# Patient Record
Sex: MALE | Race: WHITE | NOT HISPANIC OR LATINO | URBAN - METROPOLITAN AREA
[De-identification: names, ages, dates, MRNs, and addresses within clinical notes are randomized per-mention and may not be internally consistent; named-entity substitution may affect disease eponyms.]

---

## 2018-03-11 ENCOUNTER — EMERGENCY (EMERGENCY)
Facility: HOSPITAL | Age: 28
LOS: 1 days | Discharge: HOME | End: 2018-03-11
Attending: EMERGENCY MEDICINE
Payer: COMMERCIAL

## 2018-03-11 VITALS
OXYGEN SATURATION: 100 % | SYSTOLIC BLOOD PRESSURE: 127 MMHG | DIASTOLIC BLOOD PRESSURE: 73 MMHG | HEART RATE: 136 BPM | TEMPERATURE: 100 F | RESPIRATION RATE: 22 BRPM

## 2018-03-11 VITALS
DIASTOLIC BLOOD PRESSURE: 68 MMHG | SYSTOLIC BLOOD PRESSURE: 106 MMHG | HEART RATE: 109 BPM | OXYGEN SATURATION: 100 % | TEMPERATURE: 100 F | RESPIRATION RATE: 18 BRPM

## 2018-03-11 LAB
ALBUMIN SERPL ELPH-MCNC: 4.7 G/DL — SIGNIFICANT CHANGE UP (ref 3.3–5)
ALP SERPL-CCNC: 63 U/L — SIGNIFICANT CHANGE UP (ref 40–120)
ALT FLD-CCNC: 27 U/L RC — SIGNIFICANT CHANGE UP (ref 10–45)
ANION GAP SERPL CALC-SCNC: 17 MMOL/L — SIGNIFICANT CHANGE UP (ref 5–17)
APPEARANCE UR: CLEAR — SIGNIFICANT CHANGE UP
AST SERPL-CCNC: 31 U/L — SIGNIFICANT CHANGE UP (ref 10–40)
BASOPHILS # BLD AUTO: 0 K/UL — SIGNIFICANT CHANGE UP (ref 0–0.2)
BASOPHILS NFR BLD AUTO: 0.1 % — SIGNIFICANT CHANGE UP (ref 0–2)
BILIRUB SERPL-MCNC: 1 MG/DL — SIGNIFICANT CHANGE UP (ref 0.2–1.2)
BILIRUB UR-MCNC: NEGATIVE — SIGNIFICANT CHANGE UP
BUN SERPL-MCNC: 20 MG/DL — SIGNIFICANT CHANGE UP (ref 7–23)
CALCIUM SERPL-MCNC: 9.6 MG/DL — SIGNIFICANT CHANGE UP (ref 8.4–10.5)
CHLORIDE SERPL-SCNC: 96 MMOL/L — SIGNIFICANT CHANGE UP (ref 96–108)
CO2 SERPL-SCNC: 24 MMOL/L — SIGNIFICANT CHANGE UP (ref 22–31)
COLOR SPEC: YELLOW — SIGNIFICANT CHANGE UP
CREAT SERPL-MCNC: 1.12 MG/DL — SIGNIFICANT CHANGE UP (ref 0.5–1.3)
DIFF PNL FLD: NEGATIVE — SIGNIFICANT CHANGE UP
EOSINOPHIL # BLD AUTO: 0 K/UL — SIGNIFICANT CHANGE UP (ref 0–0.5)
EOSINOPHIL NFR BLD AUTO: 0.1 % — SIGNIFICANT CHANGE UP (ref 0–6)
EPI CELLS # UR: SIGNIFICANT CHANGE UP /HPF
GAS PNL BLDV: SIGNIFICANT CHANGE UP
GLUCOSE SERPL-MCNC: 131 MG/DL — HIGH (ref 70–99)
GLUCOSE UR QL: >1000 MG/DL
HCT VFR BLD CALC: 50.2 % — HIGH (ref 39–50)
HGB BLD-MCNC: 17.4 G/DL — HIGH (ref 13–17)
HMPV RNA SPEC QL NAA+PROBE: DETECTED
KETONES UR-MCNC: ABNORMAL
LEUKOCYTE ESTERASE UR-ACNC: NEGATIVE — SIGNIFICANT CHANGE UP
LYMPHOCYTES # BLD AUTO: 0.5 K/UL — LOW (ref 1–3.3)
LYMPHOCYTES # BLD AUTO: 2.8 % — LOW (ref 13–44)
MCHC RBC-ENTMCNC: 30.5 PG — SIGNIFICANT CHANGE UP (ref 27–34)
MCHC RBC-ENTMCNC: 34.7 GM/DL — SIGNIFICANT CHANGE UP (ref 32–36)
MCV RBC AUTO: 87.7 FL — SIGNIFICANT CHANGE UP (ref 80–100)
MONOCYTES # BLD AUTO: 1.2 K/UL — HIGH (ref 0–0.9)
MONOCYTES NFR BLD AUTO: 6.4 % — SIGNIFICANT CHANGE UP (ref 2–14)
NEUTROPHILS # BLD AUTO: 17 K/UL — HIGH (ref 1.8–7.4)
NEUTROPHILS NFR BLD AUTO: 90.6 % — HIGH (ref 43–77)
NITRITE UR-MCNC: NEGATIVE — SIGNIFICANT CHANGE UP
PH UR: 6.5 — SIGNIFICANT CHANGE UP (ref 5–8)
PLATELET # BLD AUTO: 215 K/UL — SIGNIFICANT CHANGE UP (ref 150–400)
POTASSIUM SERPL-MCNC: 4.6 MMOL/L — SIGNIFICANT CHANGE UP (ref 3.5–5.3)
POTASSIUM SERPL-SCNC: 4.6 MMOL/L — SIGNIFICANT CHANGE UP (ref 3.5–5.3)
PROT SERPL-MCNC: 8.4 G/DL — HIGH (ref 6–8.3)
PROT UR-MCNC: NEGATIVE — SIGNIFICANT CHANGE UP
RAPID RVP RESULT: DETECTED
RBC # BLD: 5.73 M/UL — SIGNIFICANT CHANGE UP (ref 4.2–5.8)
RBC # FLD: 11.7 % — SIGNIFICANT CHANGE UP (ref 10.3–14.5)
SODIUM SERPL-SCNC: 137 MMOL/L — SIGNIFICANT CHANGE UP (ref 135–145)
SP GR SPEC: >1.03 — HIGH (ref 1.01–1.02)
UROBILINOGEN FLD QL: NEGATIVE — SIGNIFICANT CHANGE UP
WBC # BLD: 18.8 K/UL — HIGH (ref 3.8–10.5)
WBC # FLD AUTO: 18.8 K/UL — HIGH (ref 3.8–10.5)
WBC UR QL: SIGNIFICANT CHANGE UP /HPF (ref 0–5)

## 2018-03-11 PROCEDURE — 80053 COMPREHEN METABOLIC PANEL: CPT

## 2018-03-11 PROCEDURE — 85027 COMPLETE CBC AUTOMATED: CPT

## 2018-03-11 PROCEDURE — 99284 EMERGENCY DEPT VISIT MOD MDM: CPT

## 2018-03-11 PROCEDURE — 87633 RESP VIRUS 12-25 TARGETS: CPT

## 2018-03-11 PROCEDURE — 71046 X-RAY EXAM CHEST 2 VIEWS: CPT

## 2018-03-11 PROCEDURE — 71046 X-RAY EXAM CHEST 2 VIEWS: CPT | Mod: 26

## 2018-03-11 PROCEDURE — 87486 CHLMYD PNEUM DNA AMP PROBE: CPT

## 2018-03-11 PROCEDURE — 84132 ASSAY OF SERUM POTASSIUM: CPT

## 2018-03-11 PROCEDURE — 99283 EMERGENCY DEPT VISIT LOW MDM: CPT | Mod: 25

## 2018-03-11 PROCEDURE — 82435 ASSAY OF BLOOD CHLORIDE: CPT

## 2018-03-11 PROCEDURE — 82803 BLOOD GASES ANY COMBINATION: CPT

## 2018-03-11 PROCEDURE — 85014 HEMATOCRIT: CPT

## 2018-03-11 PROCEDURE — 82947 ASSAY GLUCOSE BLOOD QUANT: CPT

## 2018-03-11 PROCEDURE — 82330 ASSAY OF CALCIUM: CPT

## 2018-03-11 PROCEDURE — 87581 M.PNEUMON DNA AMP PROBE: CPT

## 2018-03-11 PROCEDURE — 81001 URINALYSIS AUTO W/SCOPE: CPT

## 2018-03-11 PROCEDURE — 83605 ASSAY OF LACTIC ACID: CPT

## 2018-03-11 PROCEDURE — 87798 DETECT AGENT NOS DNA AMP: CPT

## 2018-03-11 PROCEDURE — 84295 ASSAY OF SERUM SODIUM: CPT

## 2018-03-11 RX ORDER — SODIUM CHLORIDE 9 MG/ML
2000 INJECTION INTRAMUSCULAR; INTRAVENOUS; SUBCUTANEOUS ONCE
Qty: 0 | Refills: 0 | Status: COMPLETED | OUTPATIENT
Start: 2018-03-11 | End: 2018-03-11

## 2018-03-11 RX ORDER — ACETAMINOPHEN 500 MG
650 TABLET ORAL ONCE
Qty: 0 | Refills: 0 | Status: COMPLETED | OUTPATIENT
Start: 2018-03-11 | End: 2018-03-11

## 2018-03-11 RX ADMIN — Medication 650 MILLIGRAM(S): at 23:20

## 2018-03-11 RX ADMIN — SODIUM CHLORIDE 2000 MILLILITER(S): 9 INJECTION INTRAMUSCULAR; INTRAVENOUS; SUBCUTANEOUS at 22:04

## 2018-03-11 NOTE — ED PROVIDER NOTE - MEDICAL DECISION MAKING DETAILS
Ann Marie Guerrero MD - Attending Physician: Pt here URI symptoms, cough, chills today. Works here so has multiple exposures. T1DM on insulin pump, tachy on arrival. Labs, IV hydration, Xr for eval

## 2018-03-11 NOTE — ED PROVIDER NOTE - PROGRESS NOTE DETAILS
+HMV. Xr neg. Mild dehydration on labs, now s/p rehydration. Feels better. Comfortable going home. Will dc. Discussed continued hydration at home, f/u with pcp, and return precautions

## 2018-03-11 NOTE — ED PROVIDER NOTE - NS_ ATTENDINGSCRIBEDETAILS _ED_A_ED_FT
Ann Marie Guerrero MD - Attending Physician: The scribe's documentation has been prepared under my direction and personally reviewed by me in its entirety. I confirm that the note above accurately reflects all work, treatment, procedures, and medical decision making performed by me.

## 2018-03-11 NOTE — ED ADULT NURSE NOTE - OBJECTIVE STATEMENT
27y male presents to ED complaining of flu like symptoms. Pt is a/ox3 and states that since Thursday he has had a cough and body aches. Pt states that today he developed chills and fever. Pt states its a productive cough with green sputum. Pt breathing spontaneous and unlabored with rhonchi to auscultation bilaterally. Pt skin is warm, dry and intact with no edema present. Pt abdomen soft, nontender, nondistended with bowel sounds present in all 4 quadrants. Pt PERRL, with equal strength bilaterally in upper and lower extremities with full sensation. Pt denies chest pain and SOB, denies n/v/d, denies dysuria, denies numbness/tingling and weakness. Pt denies throat pain and difficulty swallowing.

## 2018-03-11 NOTE — ED PROVIDER NOTE - CARE PLAN
Principal Discharge DX:	Upper respiratory tract infection, unspecified type  Secondary Diagnosis:	Human metapneumovirus (hMPV) as cause of disease classified elsewhere

## 2018-03-11 NOTE — ED PROVIDER NOTE - OBJECTIVE STATEMENT
26 yo m with pmhx of dm type 1 presents with cough and sore throat since 3 days ago on Thursday. pt reports it worsened today along with chills, aches, and nausea. Denies trouble breathing.

## 2018-04-18 ENCOUNTER — RESULT REVIEW (OUTPATIENT)
Age: 28
End: 2018-04-18

## 2018-05-09 ENCOUNTER — RESULT REVIEW (OUTPATIENT)
Age: 28
End: 2018-05-09

## 2019-10-13 ENCOUNTER — TRANSCRIPTION ENCOUNTER (OUTPATIENT)
Age: 29
End: 2019-10-13

## 2019-11-13 PROBLEM — E11.9 TYPE 2 DIABETES MELLITUS WITHOUT COMPLICATIONS: Chronic | Status: ACTIVE | Noted: 2018-03-11

## 2019-11-22 ENCOUNTER — OUTPATIENT (OUTPATIENT)
Dept: OUTPATIENT SERVICES | Facility: HOSPITAL | Age: 29
LOS: 1 days | End: 2019-11-22
Payer: COMMERCIAL

## 2019-11-22 ENCOUNTER — APPOINTMENT (OUTPATIENT)
Dept: MRI IMAGING | Facility: CLINIC | Age: 29
End: 2019-11-22
Payer: COMMERCIAL

## 2019-11-22 DIAGNOSIS — Z00.8 ENCOUNTER FOR OTHER GENERAL EXAMINATION: ICD-10-CM

## 2019-11-22 PROCEDURE — 72148 MRI LUMBAR SPINE W/O DYE: CPT | Mod: 26

## 2019-11-22 PROCEDURE — 72148 MRI LUMBAR SPINE W/O DYE: CPT

## 2020-03-09 ENCOUNTER — APPOINTMENT (OUTPATIENT)
Dept: ENDOCRINOLOGY | Facility: CLINIC | Age: 30
End: 2020-03-09
Payer: COMMERCIAL

## 2020-03-09 VITALS
HEART RATE: 98 BPM | OXYGEN SATURATION: 98 % | WEIGHT: 244 LBS | SYSTOLIC BLOOD PRESSURE: 119 MMHG | DIASTOLIC BLOOD PRESSURE: 78 MMHG

## 2020-03-09 DIAGNOSIS — Z83.3 FAMILY HISTORY OF DIABETES MELLITUS: ICD-10-CM

## 2020-03-09 PROCEDURE — 99204 OFFICE O/P NEW MOD 45 MIN: CPT | Mod: 25

## 2020-03-09 PROCEDURE — 95251 CONT GLUC MNTR ANALYSIS I&R: CPT

## 2020-03-09 RX ORDER — GLUCAGON 3 MG/1
3 POWDER NASAL
Qty: 1 | Refills: 3 | Status: ACTIVE | COMMUNITY
Start: 2020-03-09 | End: 1900-01-01

## 2020-04-08 ENCOUNTER — TRANSCRIPTION ENCOUNTER (OUTPATIENT)
Age: 30
End: 2020-04-08

## 2020-04-13 ENCOUNTER — TRANSCRIPTION ENCOUNTER (OUTPATIENT)
Age: 30
End: 2020-04-13

## 2020-04-25 ENCOUNTER — MESSAGE (OUTPATIENT)
Age: 30
End: 2020-04-25

## 2020-05-02 LAB
SARS-COV-2 IGG SERPL IA-ACNC: <0.1 INDEX
SARS-COV-2 IGG SERPL QL IA: NEGATIVE

## 2020-05-04 ENCOUNTER — APPOINTMENT (OUTPATIENT)
Dept: DISASTER EMERGENCY | Facility: HOSPITAL | Age: 30
End: 2020-05-04

## 2020-06-14 ENCOUNTER — TRANSCRIPTION ENCOUNTER (OUTPATIENT)
Age: 30
End: 2020-06-14

## 2020-06-15 ENCOUNTER — APPOINTMENT (OUTPATIENT)
Dept: ENDOCRINOLOGY | Facility: CLINIC | Age: 30
End: 2020-06-15
Payer: COMMERCIAL

## 2020-06-15 VITALS
HEIGHT: 69 IN | SYSTOLIC BLOOD PRESSURE: 120 MMHG | TEMPERATURE: 97.9 F | DIASTOLIC BLOOD PRESSURE: 82 MMHG | BODY MASS INDEX: 37.03 KG/M2 | OXYGEN SATURATION: 98 % | WEIGHT: 250 LBS

## 2020-06-15 LAB — GLUCOSE BLDC GLUCOMTR-MCNC: 100

## 2020-06-15 PROCEDURE — 82962 GLUCOSE BLOOD TEST: CPT

## 2020-06-15 PROCEDURE — 95251 CONT GLUC MNTR ANALYSIS I&R: CPT

## 2020-06-15 PROCEDURE — 99214 OFFICE O/P EST MOD 30 MIN: CPT | Mod: 25

## 2020-09-01 ENCOUNTER — APPOINTMENT (OUTPATIENT)
Dept: ENDOCRINOLOGY | Facility: CLINIC | Age: 30
End: 2020-09-01

## 2020-09-16 ENCOUNTER — APPOINTMENT (OUTPATIENT)
Dept: ENDOCRINOLOGY | Facility: CLINIC | Age: 30
End: 2020-09-16
Payer: COMMERCIAL

## 2020-09-16 VITALS
WEIGHT: 249 LBS | TEMPERATURE: 97.9 F | DIASTOLIC BLOOD PRESSURE: 80 MMHG | OXYGEN SATURATION: 98 % | SYSTOLIC BLOOD PRESSURE: 120 MMHG | BODY MASS INDEX: 36.88 KG/M2 | HEART RATE: 82 BPM | HEIGHT: 69 IN

## 2020-09-16 LAB — GLUCOSE BLDC GLUCOMTR-MCNC: 137

## 2020-09-16 PROCEDURE — 99214 OFFICE O/P EST MOD 30 MIN: CPT

## 2020-09-16 NOTE — PHYSICAL EXAM
[Well Nourished] : well nourished [Alert] : alert [No Acute Distress] : no acute distress [No Proptosis] : no proptosis [Normal Sclera/Conjunctiva] : normal sclera/conjunctiva [PERRL] : pupils equal, round and reactive to light [Normal Hearing] : hearing was normal [Normal Outer Ear/Nose] : the ears and nose were normal in appearance [No Respiratory Distress] : no respiratory distress [No Neck Mass] : no neck mass was observed [Normal TMs] : both tympanic membranes were normal [Thyroid Not Enlarged] : the thyroid was not enlarged [Normal S1, S2] : normal S1 and S2 [Clear to Auscultation] : lungs were clear to auscultation bilaterally [Normal Rate] : heart rate was normal [Not Tender] : non-tender [Regular Rhythm] : with a regular rhythm [Normal Bowel Sounds] : normal bowel sounds [No Clubbing, Cyanosis] : no clubbing  or cyanosis of the fingernails [Normal Gait] : normal gait [Soft] : abdomen soft [No Rash] : no rash [Normal Strength/Tone] : muscle strength and tone were normal [No Joint Swelling] : no joint swelling seen [No Tremors] : no tremors [No Skin Lesions] : no skin lesions [Normal Reflexes] : deep tendon reflexes were 2+ and symmetric [No Motor Deficits] : the motor exam was normal [Oriented x3] : oriented to person, place, and time [Normal Affect] : the affect was normal [Normal Insight/Judgement] : insight and judgment were intact [Normal Mood] : the mood was normal

## 2020-09-16 NOTE — REVIEW OF SYSTEMS
[Recent Weight Gain (___ Lbs)] : no recent weight gain [Decreased Appetite] : appetite not decreased [Fatigue] : no fatigue [Recent Weight Loss (___ Lbs)] : no recent weight loss [Visual Field Defect] : no visual field defect [Dry Eyes] : no dryness [Dysphagia] : no dysphagia [Nasal Congestion] : no nasal congestion [Neck Pain] : no neck pain [Dysphonia] : no dysphonia [Slow Heart Rate] : heart rate is not slow [Palpitations] : no palpitations [Chest Pain] : no chest pain [Fast Heart Rate] : heart rate is not fast [Cough] : no cough [Shortness Of Breath] : no shortness of breath [Vomiting] : no vomiting [Diarrhea] : no diarrhea [Nausea] : no nausea [Constipation] : no constipation [Joint Pain] : no joint pain [Hesistancy] : no hesitancy [Polyuria] : no polyuria [Headaches] : no headaches [Acanthosis] : no acanthosis  [Muscle Weakness] : no muscle weakness [Tremors] : no tremors [Dizziness] : no dizziness [Depression] : no depression [Polydipsia] : no polydipsia [Cold Intolerance] : no cold intolerance [Easy Bruising] : no tendency for easy bruising [Easy Bleeding] : no ~M tendency for easy bleeding

## 2020-09-16 NOTE — HISTORY OF PRESENT ILLNESS
[FreeTextEntry1] : 29 y/o male here for f/u management of T1DM (A1c 6.5% in 09 2020)\par \par \par 6 am: 120-150\par 12 pm: 120s ( first meal) \par 7 Pm: \par \par Hypoglycemia: Between and lunch and dinner \par \par DM was diagnosed at age 14. No h/o DKA in the past. \par Complications include: Last ophtho Nov 2019, + h/o microalbuminuria and resolved. No neuropathy. No MI or CVA\par Current DM Medications/Insulin: \par Medtronic 670 G (started in 2019, 1 year) and Dexcom G6. Cannot use auto-mode since using a Dexcom CGM. Did not like Medtronic Guardian sensor in the past. \par \par Past DM Medications/Insulin: Invokana 300 mg QD from April 2015 to Aug 2018. Lost some weight but he did not want to cont. He was on Metformin in the past and had GI problems. Not tried Trulicity in the past, was on Symlin in the past. \par \par Current Fingerstick Glucose Logs: Dexcom Downloaded\par 7.0% with hyperglycemia from 9pm to 4am \par Normal glucose values during the day\par \par Dexcom Alarms:\par Low On 80, urgent Low On 55\par Low repeat On 30 min\par High On 180\par High repeat On 120 min\par Hypos can be 2x/week and he is aware of symptoms with FS 70\par \par Changes his own insulin pump settings, last changed 5-6 months ago. Suspends 15 mins during showers. Runs 1-2x/week and will make sure glucose is normal and will have it suspended for 1-1.5 hrs. Sometimes normal FS or mildly high after running. Does not use temp basal feature. Changes ICR from 1:7 to 1:6 if he is aware will have high carb meal. Will not change it daily. 95% of the time ICR is 1:7. Admits to leaking infusion site if he has to take huge bolus and then has to constantly correct himself. In the last month, had to do it 3 times -- at night or evening. \par Changes site usually every 2.5-3 days and changes Dexcom every 10 days\par States alarms are going off at night since FS high after dinner and before snack and then throughout the night.\par \par Had used pens in the past, has Lantus and Novolog, has it at home. Does not have Glucagon. \par \par Current Diet Includes: mostly eating 2 meals (usually lunch and dinner). States always confident in carb counting\par Breakfast: mostly skips, omelette, english muffin and cereal -- 60-80 grams of carbs\par Lunch: cold cut sandwich, bagel, 100 calorie potato chip, granola bar, occ donut and iced coffee -- 60-80 grams\par Dinner 7pm - biggest meal: hamburgers, hot dogs, BBQ, personal pizza pies, rainbow cookie or cake (always have a desert) -- states  grams\par Snacks before bed 9pm - cold cuts, peanut butter pretzels, rice cakes with almond butter --- carb counting and using waizard\par Drinks: no soda. Drinks juice if low.\par Works 3-11pm or 4pm-12am at [employer] [facility] [employer]\par Sleeps late, awake by 10am\par \par \par (He has cut out on carbs) \par Additional history:\par PMH: Obesity, Vitamin D Deficiency, Mild HLD\par Meds: Vitamin D3 1000 units QD\par FH: Dad and GM with T2DM. Maternal GF and uncle with T2DM \par

## 2020-09-16 NOTE — ASSESSMENT
[FreeTextEntry1] : 31 y/o male with well controlled T1DM (A1c 6.5%) without complications. Also with mild HLD () and vitamin D deficiency. Will be reducing down the basal rate between 5-8 PM since patient has hypoglycemia during that time \par \par T1DM\par - Goal A1c 6.5% without hypos, patient near goal at this time\par - I had a lengthy discussion regarding healthy diet (consistent carbohydrates and low calorie content) \par - 12am-1.55, 1am-1.50, 5pm-1.35, 8pm-1.55 (total basal 36.1)\par - ICR 12am-6.7, 7am-7.0, 6pm-6.3\par - Continue using Dexcom G6 and Medtronic 670G, unable to use auto mode since not using guardian sensor\par - Has Baqsimi at home, lives with his girlfriend\par -Will check BMP, lipid panel and microal/cr ratio today \par \par Vitamin D deficiency\par - Continue Vitamin D3 1000 units QD\par - Levels normal in May 2020\par \par Mild HLD\par -  and his ASCVD risk is low\par - We discussed that ADA guidelines suggest statin for all DM patient regardless of LDL given increased risk of MI\par -Will check lipid panel today \par \par F/u with Dr. Pimentel in 3 months \par

## 2020-10-05 ENCOUNTER — TRANSCRIPTION ENCOUNTER (OUTPATIENT)
Age: 30
End: 2020-10-05

## 2020-10-05 LAB
ALBUMIN SERPL ELPH-MCNC: 4.7 G/DL
ALP BLD-CCNC: 63 U/L
ALT SERPL-CCNC: 27 U/L
ANION GAP SERPL CALC-SCNC: 14 MMOL/L
AST SERPL-CCNC: 27 U/L
BILIRUB SERPL-MCNC: 0.7 MG/DL
BUN SERPL-MCNC: 13 MG/DL
CALCIUM SERPL-MCNC: 10 MG/DL
CHLORIDE SERPL-SCNC: 99 MMOL/L
CHOLEST SERPL-MCNC: 164 MG/DL
CHOLEST/HDLC SERPL: 2.7 RATIO
CO2 SERPL-SCNC: 26 MMOL/L
CREAT SERPL-MCNC: 1 MG/DL
CREAT SPEC-SCNC: 364 MG/DL
GLUCOSE SERPL-MCNC: 135 MG/DL
HDLC SERPL-MCNC: 61 MG/DL
LDLC SERPL CALC-MCNC: 91 MG/DL
MICROALBUMIN 24H UR DL<=1MG/L-MCNC: 1.6 MG/DL
MICROALBUMIN/CREAT 24H UR-RTO: 4 MG/G
POTASSIUM SERPL-SCNC: 4.5 MMOL/L
PROT SERPL-MCNC: 7.1 G/DL
SODIUM SERPL-SCNC: 139 MMOL/L
T4 FREE SERPL-MCNC: 1.4 NG/DL
TRIGL SERPL-MCNC: 60 MG/DL
TSH SERPL-ACNC: 1.97 UIU/ML

## 2020-10-08 ENCOUNTER — TRANSCRIPTION ENCOUNTER (OUTPATIENT)
Age: 30
End: 2020-10-08

## 2020-12-14 ENCOUNTER — APPOINTMENT (OUTPATIENT)
Dept: ENDOCRINOLOGY | Facility: CLINIC | Age: 30
End: 2020-12-14

## 2020-12-14 ENCOUNTER — TRANSCRIPTION ENCOUNTER (OUTPATIENT)
Age: 30
End: 2020-12-14

## 2020-12-15 ENCOUNTER — TRANSCRIPTION ENCOUNTER (OUTPATIENT)
Age: 30
End: 2020-12-15

## 2020-12-30 ENCOUNTER — APPOINTMENT (OUTPATIENT)
Dept: ENDOCRINOLOGY | Facility: CLINIC | Age: 30
End: 2020-12-30
Payer: COMMERCIAL

## 2020-12-30 VITALS
SYSTOLIC BLOOD PRESSURE: 128 MMHG | WEIGHT: 247 LBS | OXYGEN SATURATION: 98 % | HEIGHT: 69 IN | BODY MASS INDEX: 36.58 KG/M2 | DIASTOLIC BLOOD PRESSURE: 70 MMHG | TEMPERATURE: 98.1 F | HEART RATE: 89 BPM

## 2020-12-30 LAB
GLUCOSE BLDC GLUCOMTR-MCNC: 135
HBA1C MFR BLD HPLC: 6.1

## 2020-12-30 PROCEDURE — 82962 GLUCOSE BLOOD TEST: CPT

## 2020-12-30 PROCEDURE — 99214 OFFICE O/P EST MOD 30 MIN: CPT | Mod: 25

## 2020-12-30 PROCEDURE — 99072 ADDL SUPL MATRL&STAF TM PHE: CPT

## 2020-12-30 PROCEDURE — 83036 HEMOGLOBIN GLYCOSYLATED A1C: CPT | Mod: QW

## 2020-12-30 NOTE — REVIEW OF SYSTEMS
[Fatigue] : no fatigue [Decreased Appetite] : appetite not decreased [Recent Weight Gain (___ Lbs)] : no recent weight gain [Recent Weight Loss (___ Lbs)] : no recent weight loss [Visual Field Defect] : no visual field defect [Dry Eyes] : no dryness [Dysphagia] : no dysphagia [Neck Pain] : no neck pain [Dysphonia] : no dysphonia [Nasal Congestion] : no nasal congestion [Chest Pain] : no chest pain [Slow Heart Rate] : heart rate is not slow [Palpitations] : no palpitations [Fast Heart Rate] : heart rate is not fast [Shortness Of Breath] : no shortness of breath [Cough] : no cough [Nausea] : no nausea [Constipation] : no constipation [Vomiting] : no vomiting [Diarrhea] : no diarrhea [Polyuria] : no polyuria [Hesistancy] : no hesitancy [Joint Pain] : no joint pain [Headaches] : no headaches [Dizziness] : no dizziness [Tremors] : no tremors [Pain/Numbness of Digits] : no pain/numbness of digits [Depression] : no depression [Polydipsia] : no polydipsia [Cold Intolerance] : no cold intolerance [Easy Bleeding] : no ~M tendency for easy bleeding [Easy Bruising] : no tendency for easy bruising

## 2020-12-30 NOTE — ASSESSMENT
[FreeTextEntry1] : 31 y/o male with well controlled T1DM (A1c 6.1%) without complications. Also with mild HLD () and vitamin D deficiency. Noted to have hyperglycemia post dinner due to snacking. Possibly insulin resistance post meal. \par \par T1DM\par - Goal A1c 6.1% without hypos, patient near goal at this time\par - I had a lengthy discussion regarding healthy diet (consistent carbohydrates and low calorie content) \par - 12am-1.55, 1am-1.50, 5pm-1.35, 8pm-1.55 (total basal 36.1)\par - ICR 12am-6.7, 7am-7.0, 6pm-6.3\par -Will be changing ICR to 6.0 between 9 pm- 1 am \par - Continue using Dexcom G6 and Medtronic 670G, unable to use auto mode since not using guardian sensor\par - Has Baqsimi at home, lives with his girlfriend\par \par \par Vitamin D deficiency\par - Continue Vitamin D3 1000 units QD\par - Levels normal in May 2020\par \par Mild HLD\par - LDL 91 and his ASCVD risk is low\par - We discussed that ADA guidelines suggest statin for all DM patient regardless of LDL given increased risk of MI\par \par \par F/u with Dr. Pimentel in 5 months \par \par

## 2020-12-30 NOTE — HISTORY OF PRESENT ILLNESS
[FreeTextEntry1] : 31 y/o male here for f/u management of T1DM (A1c 6.5% in 09 2020)\par \par Reuiring 2-3 corrections in middle of the night \par \par 200s overnight \par 6 am: 140-150\par 12 pm: 100-130 ( first meal) \par 7 Pm: 100-130\par \par 11PM: 150-180\par \par Hypoglycemia: Between and lunch and dinner \par \par DM was diagnosed at age 14. No h/o DKA in the past. \par Complications include: Last ophtho Nov 2019, + h/o microalbuminuria and resolved. No neuropathy. No MI or CVA\par Current DM Medications/Insulin: \par Medtronic 670 G (started in 2019, 1 year) and Dexcom G6. Cannot use auto-mode since using a Dexcom CGM. Did not like Medtronic Guardian sensor in the past. \par \par Past DM Medications/Insulin: Invokana 300 mg QD from April 2015 to Aug 2018. Lost some weight but he did not want to cont. He was on Metformin in the past and had GI problems. Not tried Trulicity in the past, was on Symlin in the past. \par \par Rarely having hypoglycemia \par \par \par No retinopathy 10/2020\par No neuropathy \par Working on weight loss \par \par -He has been working out without the pump \par \par \par Changes his own insulin pump settings, last changed 5-6 months ago. Suspends 15 mins during showers. Runs 1-2x/week and will make sure glucose is normal and will have it suspended for 1-1.5 hrs. Sometimes normal FS or mildly high after running. Does not use temp basal feature. Changes ICR from 1:7 to 1:6 if he is aware will have high carb meal. Will not change it daily. 95% of the time ICR is 1:7. Admits to leaking infusion site if he has to take huge bolus and then has to constantly correct himself. In the last month, had to do it 3 times -- at night or evening. \par Changes site usually every 2.5-3 days and changes Dexcom every 10 days\par States alarms are going off at night since FS high after dinner and before snack and then throughout the night.\par \par Had used pens in the past, has Lantus and Novolog, has it at home. Does not have Glucagon. \par \par Current Diet Includes: mostly eating 2 meals (usually lunch and dinner). States always confident in carb counting\par Breakfast: mostly skips, omelette, english muffin and cereal -- 60-80 grams of carbs\par Lunch: cold cut sandwich, bagel, 100 calorie potato chip, granola bar, occ donut and iced coffee -- 60-80 grams\par Dinner 7pm - biggest meal: hamburgers, hot dogs, BBQ, personal pizza pies, rainbow cookie or cake (always have a desert) -- states  grams\par Snacks before bed 9pm - cold cuts, peanut butter pretzels, rice cakes with almond butter --- carb counting and using waizard\par Drinks: no soda. Drinks juice if low.\par Works 3-11pm or 4pm-12am at [employer]\par Sleeps late, awake by 10am\par \par \par (He has cut out on carbs) \par Additional history:\par PMH: Obesity, Vitamin D Deficiency, Mild HLD\par Meds: Vitamin D3 1000 units QD\par FH: Dad and GM with T2DM. Maternal GF and uncle with T2DM \par

## 2021-01-04 ENCOUNTER — APPOINTMENT (OUTPATIENT)
Dept: ENDOCRINOLOGY | Facility: CLINIC | Age: 31
End: 2021-01-04

## 2021-01-27 ENCOUNTER — APPOINTMENT (OUTPATIENT)
Dept: PSYCHIATRY | Facility: CLINIC | Age: 31
End: 2021-01-27

## 2021-02-01 ENCOUNTER — APPOINTMENT (OUTPATIENT)
Dept: PSYCHIATRY | Facility: CLINIC | Age: 31
End: 2021-02-01

## 2021-02-10 ENCOUNTER — APPOINTMENT (OUTPATIENT)
Dept: PSYCHIATRY | Facility: CLINIC | Age: 31
End: 2021-02-10
Payer: COMMERCIAL

## 2021-02-10 PROCEDURE — 99072 ADDL SUPL MATRL&STAF TM PHE: CPT

## 2021-02-10 PROCEDURE — 90791 PSYCH DIAGNOSTIC EVALUATION: CPT

## 2021-02-17 ENCOUNTER — APPOINTMENT (OUTPATIENT)
Dept: PSYCHIATRY | Facility: CLINIC | Age: 31
End: 2021-02-17
Payer: COMMERCIAL

## 2021-02-17 PROCEDURE — 99072 ADDL SUPL MATRL&STAF TM PHE: CPT

## 2021-02-17 PROCEDURE — 90834 PSYTX W PT 45 MINUTES: CPT

## 2021-02-22 ENCOUNTER — APPOINTMENT (OUTPATIENT)
Dept: PSYCHIATRY | Facility: CLINIC | Age: 31
End: 2021-02-22
Payer: COMMERCIAL

## 2021-02-22 PROCEDURE — 99072 ADDL SUPL MATRL&STAF TM PHE: CPT

## 2021-02-22 PROCEDURE — 90837 PSYTX W PT 60 MINUTES: CPT

## 2021-03-01 ENCOUNTER — APPOINTMENT (OUTPATIENT)
Dept: PSYCHIATRY | Facility: CLINIC | Age: 31
End: 2021-03-01
Payer: COMMERCIAL

## 2021-03-01 PROCEDURE — 90837 PSYTX W PT 60 MINUTES: CPT

## 2021-03-01 PROCEDURE — 99072 ADDL SUPL MATRL&STAF TM PHE: CPT

## 2021-03-08 ENCOUNTER — APPOINTMENT (OUTPATIENT)
Dept: PSYCHIATRY | Facility: CLINIC | Age: 31
End: 2021-03-08
Payer: COMMERCIAL

## 2021-03-08 PROCEDURE — 99072 ADDL SUPL MATRL&STAF TM PHE: CPT

## 2021-03-08 PROCEDURE — 90837 PSYTX W PT 60 MINUTES: CPT

## 2021-03-15 ENCOUNTER — APPOINTMENT (OUTPATIENT)
Dept: PSYCHIATRY | Facility: CLINIC | Age: 31
End: 2021-03-15
Payer: COMMERCIAL

## 2021-03-15 PROCEDURE — 90837 PSYTX W PT 60 MINUTES: CPT

## 2021-03-15 PROCEDURE — 99072 ADDL SUPL MATRL&STAF TM PHE: CPT

## 2021-03-22 ENCOUNTER — APPOINTMENT (OUTPATIENT)
Dept: PSYCHIATRY | Facility: CLINIC | Age: 31
End: 2021-03-22
Payer: COMMERCIAL

## 2021-03-22 PROCEDURE — 90837 PSYTX W PT 60 MINUTES: CPT

## 2021-03-22 PROCEDURE — 99072 ADDL SUPL MATRL&STAF TM PHE: CPT

## 2021-03-29 ENCOUNTER — APPOINTMENT (OUTPATIENT)
Dept: PSYCHIATRY | Facility: CLINIC | Age: 31
End: 2021-03-29
Payer: COMMERCIAL

## 2021-03-29 PROCEDURE — 90837 PSYTX W PT 60 MINUTES: CPT

## 2021-03-29 PROCEDURE — 99072 ADDL SUPL MATRL&STAF TM PHE: CPT

## 2021-04-05 ENCOUNTER — APPOINTMENT (OUTPATIENT)
Dept: PSYCHIATRY | Facility: CLINIC | Age: 31
End: 2021-04-05
Payer: COMMERCIAL

## 2021-04-05 PROCEDURE — 90837 PSYTX W PT 60 MINUTES: CPT

## 2021-04-05 PROCEDURE — 99072 ADDL SUPL MATRL&STAF TM PHE: CPT

## 2021-04-12 ENCOUNTER — NON-APPOINTMENT (OUTPATIENT)
Age: 31
End: 2021-04-12

## 2021-04-12 ENCOUNTER — APPOINTMENT (OUTPATIENT)
Dept: PSYCHIATRY | Facility: CLINIC | Age: 31
End: 2021-04-12
Payer: COMMERCIAL

## 2021-04-12 PROCEDURE — 99072 ADDL SUPL MATRL&STAF TM PHE: CPT

## 2021-04-12 PROCEDURE — 90837 PSYTX W PT 60 MINUTES: CPT

## 2021-04-19 ENCOUNTER — APPOINTMENT (OUTPATIENT)
Dept: PSYCHIATRY | Facility: CLINIC | Age: 31
End: 2021-04-19
Payer: COMMERCIAL

## 2021-04-19 PROCEDURE — 90837 PSYTX W PT 60 MINUTES: CPT

## 2021-04-19 PROCEDURE — 99072 ADDL SUPL MATRL&STAF TM PHE: CPT

## 2021-05-03 ENCOUNTER — APPOINTMENT (OUTPATIENT)
Dept: PSYCHIATRY | Facility: CLINIC | Age: 31
End: 2021-05-03
Payer: COMMERCIAL

## 2021-05-03 PROCEDURE — 90837 PSYTX W PT 60 MINUTES: CPT

## 2021-05-03 PROCEDURE — 99072 ADDL SUPL MATRL&STAF TM PHE: CPT

## 2021-05-10 ENCOUNTER — APPOINTMENT (OUTPATIENT)
Dept: PSYCHIATRY | Facility: CLINIC | Age: 31
End: 2021-05-10
Payer: COMMERCIAL

## 2021-05-10 PROCEDURE — 90837 PSYTX W PT 60 MINUTES: CPT

## 2021-05-10 PROCEDURE — 99072 ADDL SUPL MATRL&STAF TM PHE: CPT

## 2021-05-17 ENCOUNTER — APPOINTMENT (OUTPATIENT)
Dept: PSYCHIATRY | Facility: CLINIC | Age: 31
End: 2021-05-17
Payer: COMMERCIAL

## 2021-05-17 PROCEDURE — 99072 ADDL SUPL MATRL&STAF TM PHE: CPT

## 2021-05-17 PROCEDURE — 90837 PSYTX W PT 60 MINUTES: CPT

## 2021-05-24 ENCOUNTER — APPOINTMENT (OUTPATIENT)
Dept: PSYCHIATRY | Facility: CLINIC | Age: 31
End: 2021-05-24

## 2021-06-07 ENCOUNTER — APPOINTMENT (OUTPATIENT)
Dept: PSYCHIATRY | Facility: CLINIC | Age: 31
End: 2021-06-07
Payer: COMMERCIAL

## 2021-06-07 PROCEDURE — 99072 ADDL SUPL MATRL&STAF TM PHE: CPT

## 2021-06-07 PROCEDURE — 90837 PSYTX W PT 60 MINUTES: CPT

## 2021-06-09 ENCOUNTER — APPOINTMENT (OUTPATIENT)
Dept: ENDOCRINOLOGY | Facility: CLINIC | Age: 31
End: 2021-06-09
Payer: COMMERCIAL

## 2021-06-09 VITALS
OXYGEN SATURATION: 97 % | DIASTOLIC BLOOD PRESSURE: 78 MMHG | BODY MASS INDEX: 34.8 KG/M2 | HEIGHT: 69 IN | SYSTOLIC BLOOD PRESSURE: 118 MMHG | HEART RATE: 92 BPM | WEIGHT: 235 LBS

## 2021-06-09 LAB
GLUCOSE BLDC GLUCOMTR-MCNC: 212
HBA1C MFR BLD HPLC: 6.5

## 2021-06-09 PROCEDURE — 83036 HEMOGLOBIN GLYCOSYLATED A1C: CPT | Mod: QW

## 2021-06-09 PROCEDURE — 99072 ADDL SUPL MATRL&STAF TM PHE: CPT

## 2021-06-09 PROCEDURE — 95251 CONT GLUC MNTR ANALYSIS I&R: CPT

## 2021-06-09 PROCEDURE — 82962 GLUCOSE BLOOD TEST: CPT

## 2021-06-09 PROCEDURE — 36415 COLL VENOUS BLD VENIPUNCTURE: CPT

## 2021-06-09 PROCEDURE — 99214 OFFICE O/P EST MOD 30 MIN: CPT | Mod: 25

## 2021-06-14 ENCOUNTER — APPOINTMENT (OUTPATIENT)
Dept: PSYCHIATRY | Facility: CLINIC | Age: 31
End: 2021-06-14
Payer: COMMERCIAL

## 2021-06-14 PROCEDURE — 99072 ADDL SUPL MATRL&STAF TM PHE: CPT

## 2021-06-14 PROCEDURE — 90837 PSYTX W PT 60 MINUTES: CPT

## 2021-06-16 ENCOUNTER — TRANSCRIPTION ENCOUNTER (OUTPATIENT)
Age: 31
End: 2021-06-16

## 2021-06-16 LAB
25(OH)D3 SERPL-MCNC: 34.7 NG/ML
ALBUMIN SERPL ELPH-MCNC: 4.7 G/DL
ALP BLD-CCNC: 73 U/L
ALT SERPL-CCNC: 28 U/L
ANION GAP SERPL CALC-SCNC: 9 MMOL/L
AST SERPL-CCNC: 27 U/L
BASOPHILS # BLD AUTO: 0.03 K/UL
BASOPHILS NFR BLD AUTO: 0.5 %
BILIRUB SERPL-MCNC: 0.3 MG/DL
BUN SERPL-MCNC: 14 MG/DL
CALCIUM SERPL-MCNC: 9.3 MG/DL
CHLORIDE SERPL-SCNC: 102 MMOL/L
CHOLEST SERPL-MCNC: 190 MG/DL
CO2 SERPL-SCNC: 28 MMOL/L
CREAT SERPL-MCNC: 0.88 MG/DL
CREAT SPEC-SCNC: 257 MG/DL
EOSINOPHIL # BLD AUTO: 0.08 K/UL
EOSINOPHIL NFR BLD AUTO: 1.3 %
GLUCOSE SERPL-MCNC: 202 MG/DL
HCT VFR BLD CALC: 47.2 %
HDLC SERPL-MCNC: 58 MG/DL
HGB BLD-MCNC: 15.8 G/DL
IMM GRANULOCYTES NFR BLD AUTO: 0.2 %
LDLC SERPL CALC-MCNC: 121 MG/DL
LYMPHOCYTES # BLD AUTO: 1.32 K/UL
LYMPHOCYTES NFR BLD AUTO: 21.3 %
MAN DIFF?: NORMAL
MCHC RBC-ENTMCNC: 28.3 PG
MCHC RBC-ENTMCNC: 33.5 GM/DL
MCV RBC AUTO: 84.4 FL
MICROALBUMIN 24H UR DL<=1MG/L-MCNC: 1.6 MG/DL
MICROALBUMIN/CREAT 24H UR-RTO: 6 MG/G
MONOCYTES # BLD AUTO: 0.5 K/UL
MONOCYTES NFR BLD AUTO: 8.1 %
NEUTROPHILS # BLD AUTO: 4.27 K/UL
NEUTROPHILS NFR BLD AUTO: 68.6 %
NONHDLC SERPL-MCNC: 132 MG/DL
PLATELET # BLD AUTO: 271 K/UL
POTASSIUM SERPL-SCNC: 4.7 MMOL/L
PROT SERPL-MCNC: 7 G/DL
RBC # BLD: 5.59 M/UL
RBC # FLD: 12.6 %
SODIUM SERPL-SCNC: 139 MMOL/L
TRIGL SERPL-MCNC: 57 MG/DL
TSH SERPL-ACNC: 2.39 UIU/ML
WBC # FLD AUTO: 6.21 K/UL

## 2021-06-21 ENCOUNTER — APPOINTMENT (OUTPATIENT)
Dept: PSYCHIATRY | Facility: CLINIC | Age: 31
End: 2021-06-21
Payer: COMMERCIAL

## 2021-06-21 ENCOUNTER — APPOINTMENT (OUTPATIENT)
Dept: INTERNAL MEDICINE | Facility: CLINIC | Age: 31
End: 2021-06-21
Payer: COMMERCIAL

## 2021-06-21 VITALS
HEART RATE: 113 BPM | OXYGEN SATURATION: 98 % | HEIGHT: 69 IN | SYSTOLIC BLOOD PRESSURE: 122 MMHG | BODY MASS INDEX: 37.33 KG/M2 | WEIGHT: 252 LBS | DIASTOLIC BLOOD PRESSURE: 84 MMHG

## 2021-06-21 DIAGNOSIS — Z23 ENCOUNTER FOR IMMUNIZATION: ICD-10-CM

## 2021-06-21 DIAGNOSIS — Z13.31 ENCOUNTER FOR SCREENING FOR DEPRESSION: ICD-10-CM

## 2021-06-21 PROCEDURE — G0009: CPT

## 2021-06-21 PROCEDURE — 90732 PPSV23 VACC 2 YRS+ SUBQ/IM: CPT

## 2021-06-21 PROCEDURE — 90472 IMMUNIZATION ADMIN EACH ADD: CPT

## 2021-06-21 PROCEDURE — 90837 PSYTX W PT 60 MINUTES: CPT

## 2021-06-21 PROCEDURE — 99385 PREV VISIT NEW AGE 18-39: CPT | Mod: 25

## 2021-06-21 PROCEDURE — 99072 ADDL SUPL MATRL&STAF TM PHE: CPT

## 2021-06-21 PROCEDURE — 90715 TDAP VACCINE 7 YRS/> IM: CPT

## 2021-06-21 PROCEDURE — G0444 DEPRESSION SCREEN ANNUAL: CPT | Mod: NC,59

## 2021-06-21 NOTE — PHYSICAL EXAM
[No Acute Distress] : no acute distress [Well Nourished] : well nourished [Well Developed] : well developed [Well-Appearing] : well-appearing [Normal Sclera/Conjunctiva] : normal sclera/conjunctiva [PERRL] : pupils equal round and reactive to light [Normal Outer Ear/Nose] : the outer ears and nose were normal in appearance [EOMI] : extraocular movements intact [No JVD] : no jugular venous distention [No Lymphadenopathy] : no lymphadenopathy [Supple] : supple [Thyroid Normal, No Nodules] : the thyroid was normal and there were no nodules present [No Respiratory Distress] : no respiratory distress  [No Accessory Muscle Use] : no accessory muscle use [Clear to Auscultation] : lungs were clear to auscultation bilaterally [Normal Rate] : normal rate  [Normal S1, S2] : normal S1 and S2 [Regular Rhythm] : with a regular rhythm [No Murmur] : no murmur heard [No Carotid Bruits] : no carotid bruits [No Abdominal Bruit] : a ~M bruit was not heard ~T in the abdomen [No Varicosities] : no varicosities [Pedal Pulses Present] : the pedal pulses are present [No Edema] : there was no peripheral edema [No Palpable Aorta] : no palpable aorta [No Extremity Clubbing/Cyanosis] : no extremity clubbing/cyanosis [Soft] : abdomen soft [Non Tender] : non-tender [Non-distended] : non-distended [No Masses] : no abdominal mass palpated [No HSM] : no HSM [Normal Bowel Sounds] : normal bowel sounds [Normal Posterior Cervical Nodes] : no posterior cervical lymphadenopathy [Normal Anterior Cervical Nodes] : no anterior cervical lymphadenopathy [No CVA Tenderness] : no CVA  tenderness [No Spinal Tenderness] : no spinal tenderness [No Joint Swelling] : no joint swelling [Grossly Normal Strength/Tone] : grossly normal strength/tone [No Rash] : no rash [Coordination Grossly Intact] : coordination grossly intact [No Focal Deficits] : no focal deficits [Normal Gait] : normal gait [Deep Tendon Reflexes (DTR)] : deep tendon reflexes were 2+ and symmetric [Normal Affect] : the affect was normal [Normal Insight/Judgement] : insight and judgment were intact [Comprehensive Foot Exam Normal] : Right and left foot were examined and both feet are normal. No ulcers in either foot. Toes are normal and with full ROM.  Normal tactile sensation with monofilament testing throughout both feet

## 2021-06-21 NOTE — HISTORY OF PRESENT ILLNESS
[de-identified] : The patient is a very pleasant 31-year-old male with a history of type 1 diabetes and OCD who presents to the office today to establish care.  He has no specific complaints.  His fiancée is a patient of mine but that he should have a primary care physician.  He follows closely with endocrinology as he has an insulin pump include continuous glucose monitor.  He reports his last hemoglobin A1c was 6.5 and he is well controlled.  He notices weight is too high but he does not always have the best diet.  He has never had a Pneumovax and thinks his last tetanus shot was more than 10 years ago.

## 2021-06-21 NOTE — HEALTH RISK ASSESSMENT
[Very Good] : ~his/her~  mood as very good [] : No [No] : In the past 12 months have you used drugs other than those required for medical reasons? No [0] : 2) Feeling down, depressed, or hopeless: Not at all (0) [de-identified] : Occasional [de-identified] : Tries to exercise [[id number]] : 0 [Change in mental status noted] : No change in mental status noted [Language] : denies difficulty with language [Behavior] : denies difficulty with behavior [Learning/Retaining New Information] : denies difficulty learning/retaining new information [Handling Complex Tasks] : denies difficulty handling complex tasks [Reasoning] : denies difficulty with reasoning [Spatial Ability and Orientation] : denies difficulty with spatial ability and orientation [None] : None [Employed] : employed [Fully functional (bathing, dressing, toileting, transferring, walking, feeding)] : Fully functional (bathing, dressing, toileting, transferring, walking, feeding) [Reports changes in hearing] : Reports no changes in hearing [Fully functional (using the telephone, shopping, preparing meals, housekeeping, doing laundry, using] : Fully functional and needs no help or supervision to perform IADLs (using the telephone, shopping, preparing meals, housekeeping, doing laundry, using transportation, managing medications and managing finances) [Reports changes in vision] : Reports no changes in vision [Smoke Detector] : smoke detector [Carbon Monoxide Detector] : carbon monoxide detector [Seat Belt] :  uses seat belt [Sunscreen] : uses sunscreen [FreeTextEntry2] : Given technician [FreeTextEntry3] : Engaged [de-identified] : Make sure he sees ophthalmology yearly, usually in October [Patient/Caregiver not ready to engage] : Patient/Caregiver not ready to engage [AdvancecareDate] : 06/21

## 2021-06-21 NOTE — ASSESSMENT
[FreeTextEntry1] : 1.  General health maintenance -following with behavioral health for his OCD.  No depressive symptoms today.  Pneumovax and Tdap given.  He has completed the Covid series in December.  He had more during.\par 2.  Type 1 diabetes, well controlled.  Follows closely with endocrine.  Goes for yearly eye exams, foot care discussed.  Management as per Endo\par 3.  No need for blood work as it was done recently\par 4.  Return to office in 1 year or as needed

## 2021-06-22 ENCOUNTER — APPOINTMENT (OUTPATIENT)
Dept: DERMATOLOGY | Facility: CLINIC | Age: 31
End: 2021-06-22
Payer: COMMERCIAL

## 2021-06-22 PROCEDURE — 99072 ADDL SUPL MATRL&STAF TM PHE: CPT

## 2021-06-22 PROCEDURE — 99204 OFFICE O/P NEW MOD 45 MIN: CPT

## 2021-07-06 ENCOUNTER — APPOINTMENT (OUTPATIENT)
Dept: ENDOCRINOLOGY | Facility: CLINIC | Age: 31
End: 2021-07-06
Payer: COMMERCIAL

## 2021-07-06 PROCEDURE — G0108 DIAB MANAGE TRN  PER INDIV: CPT

## 2021-07-06 PROCEDURE — 99072 ADDL SUPL MATRL&STAF TM PHE: CPT

## 2021-07-07 ENCOUNTER — TRANSCRIPTION ENCOUNTER (OUTPATIENT)
Age: 31
End: 2021-07-07

## 2021-07-08 ENCOUNTER — TRANSCRIPTION ENCOUNTER (OUTPATIENT)
Age: 31
End: 2021-07-08

## 2021-07-16 ENCOUNTER — TRANSCRIPTION ENCOUNTER (OUTPATIENT)
Age: 31
End: 2021-07-16

## 2021-07-19 ENCOUNTER — APPOINTMENT (OUTPATIENT)
Dept: PSYCHIATRY | Facility: CLINIC | Age: 31
End: 2021-07-19
Payer: COMMERCIAL

## 2021-07-19 PROCEDURE — 99072 ADDL SUPL MATRL&STAF TM PHE: CPT

## 2021-07-19 PROCEDURE — 90837 PSYTX W PT 60 MINUTES: CPT

## 2021-07-22 ENCOUNTER — TRANSCRIPTION ENCOUNTER (OUTPATIENT)
Age: 31
End: 2021-07-22

## 2021-07-25 ENCOUNTER — TRANSCRIPTION ENCOUNTER (OUTPATIENT)
Age: 31
End: 2021-07-25

## 2021-07-26 ENCOUNTER — APPOINTMENT (OUTPATIENT)
Dept: PSYCHIATRY | Facility: CLINIC | Age: 31
End: 2021-07-26

## 2021-07-29 ENCOUNTER — TRANSCRIPTION ENCOUNTER (OUTPATIENT)
Age: 31
End: 2021-07-29

## 2021-08-02 ENCOUNTER — APPOINTMENT (OUTPATIENT)
Dept: PSYCHIATRY | Facility: CLINIC | Age: 31
End: 2021-08-02
Payer: COMMERCIAL

## 2021-08-02 PROCEDURE — 90837 PSYTX W PT 60 MINUTES: CPT

## 2021-08-09 ENCOUNTER — APPOINTMENT (OUTPATIENT)
Dept: PSYCHIATRY | Facility: CLINIC | Age: 31
End: 2021-08-09
Payer: COMMERCIAL

## 2021-08-09 PROCEDURE — 90837 PSYTX W PT 60 MINUTES: CPT

## 2021-08-16 ENCOUNTER — APPOINTMENT (OUTPATIENT)
Dept: PSYCHIATRY | Facility: CLINIC | Age: 31
End: 2021-08-16
Payer: COMMERCIAL

## 2021-08-16 PROCEDURE — 90837 PSYTX W PT 60 MINUTES: CPT

## 2021-08-23 ENCOUNTER — APPOINTMENT (OUTPATIENT)
Dept: PSYCHIATRY | Facility: CLINIC | Age: 31
End: 2021-08-23

## 2021-08-24 NOTE — ED ADULT NURSE NOTE - QUALITY
1230 [address] removed. Unable to void at that time, urinal at bedside. Bandage remains CDI, no strikethrough.  
aching

## 2021-08-30 ENCOUNTER — APPOINTMENT (OUTPATIENT)
Dept: PSYCHIATRY | Facility: CLINIC | Age: 31
End: 2021-08-30

## 2021-09-07 ENCOUNTER — APPOINTMENT (OUTPATIENT)
Dept: INTERNAL MEDICINE | Facility: CLINIC | Age: 31
End: 2021-09-07
Payer: COMMERCIAL

## 2021-09-07 VITALS
OXYGEN SATURATION: 98 % | BODY MASS INDEX: 37.33 KG/M2 | RESPIRATION RATE: 16 BRPM | DIASTOLIC BLOOD PRESSURE: 84 MMHG | HEART RATE: 84 BPM | WEIGHT: 252 LBS | SYSTOLIC BLOOD PRESSURE: 122 MMHG | HEIGHT: 69 IN

## 2021-09-07 PROCEDURE — 99213 OFFICE O/P EST LOW 20 MIN: CPT

## 2021-09-07 NOTE — HISTORY OF PRESENT ILLNESS
[FreeTextEntry8] : 30 yo male with a h/o Type 1 DM here to r/o a hernia.  Is a transporter and lifts heavy patients.  Has had dull LLQ pain for 4 days but nothing the last 2 days.  Never felt a bulge.  Pain happens with standing.  Lifts moderate weights at the gym - nothing above 45 lbs.  Was not constipated.  \par \par Had COVID 3 weeks ago - has been out of work and then vacation.  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  \par \par Doesn't remember any specific event causing it.  Did have sex recently but can't remember if the pain was before or came afterwards.  Lifted weights yesterday without any problems.  No SOB. Some residual cough.  No fever.  Had Moderna back in Feb.

## 2021-09-07 NOTE — ASSESSMENT
[FreeTextEntry1] : 32 yo male with c/o dull, LLQ pain now resolved.  No hernia appreciated on exam.  Will follow clinically.  If recurs will go for a limited US of the LLQ.  Declines STD testing.

## 2021-09-07 NOTE — REVIEW OF SYSTEMS
[Shortness Of Breath] : no shortness of breath [Negative] : Cardiovascular [FreeTextEntry6] : slight cough post COVID [FreeTextEntry7] : see HPI

## 2021-09-07 NOTE — PHYSICAL EXAM
[No Acute Distress] : no acute distress [Well Nourished] : well nourished [Well Developed] : well developed [Well-Appearing] : well-appearing [Soft] : abdomen soft [Non Tender] : non-tender [Non-distended] : non-distended [No Masses] : no abdominal mass palpated [Normal Bowel Sounds] : normal bowel sounds [No Hernias] : no hernias

## 2021-09-13 ENCOUNTER — APPOINTMENT (OUTPATIENT)
Dept: PSYCHIATRY | Facility: CLINIC | Age: 31
End: 2021-09-13
Payer: COMMERCIAL

## 2021-09-13 PROCEDURE — 90837 PSYTX W PT 60 MINUTES: CPT

## 2021-09-14 ENCOUNTER — APPOINTMENT (OUTPATIENT)
Dept: ENDOCRINOLOGY | Facility: CLINIC | Age: 31
End: 2021-09-14
Payer: COMMERCIAL

## 2021-09-14 PROCEDURE — P0006: CPT

## 2021-09-15 ENCOUNTER — NON-APPOINTMENT (OUTPATIENT)
Age: 31
End: 2021-09-15

## 2021-09-20 ENCOUNTER — APPOINTMENT (OUTPATIENT)
Dept: PSYCHIATRY | Facility: CLINIC | Age: 31
End: 2021-09-20
Payer: COMMERCIAL

## 2021-09-20 PROCEDURE — 90837 PSYTX W PT 60 MINUTES: CPT

## 2021-09-27 ENCOUNTER — APPOINTMENT (OUTPATIENT)
Dept: PSYCHIATRY | Facility: CLINIC | Age: 31
End: 2021-09-27
Payer: COMMERCIAL

## 2021-09-27 PROCEDURE — 90837 PSYTX W PT 60 MINUTES: CPT

## 2021-10-04 ENCOUNTER — APPOINTMENT (OUTPATIENT)
Dept: PSYCHIATRY | Facility: CLINIC | Age: 31
End: 2021-10-04
Payer: COMMERCIAL

## 2021-10-04 PROCEDURE — 90837 PSYTX W PT 60 MINUTES: CPT

## 2021-10-05 ENCOUNTER — APPOINTMENT (OUTPATIENT)
Dept: PSYCHIATRY | Facility: CLINIC | Age: 31
End: 2021-10-05
Payer: COMMERCIAL

## 2021-10-05 PROCEDURE — 99215 OFFICE O/P EST HI 40 MIN: CPT

## 2021-10-06 ENCOUNTER — TRANSCRIPTION ENCOUNTER (OUTPATIENT)
Age: 31
End: 2021-10-06

## 2021-10-07 ENCOUNTER — TRANSCRIPTION ENCOUNTER (OUTPATIENT)
Age: 31
End: 2021-10-07

## 2021-10-11 ENCOUNTER — APPOINTMENT (OUTPATIENT)
Dept: PSYCHIATRY | Facility: CLINIC | Age: 31
End: 2021-10-11
Payer: COMMERCIAL

## 2021-10-11 PROCEDURE — 90837 PSYTX W PT 60 MINUTES: CPT

## 2021-10-12 ENCOUNTER — APPOINTMENT (OUTPATIENT)
Dept: PULMONOLOGY | Facility: CLINIC | Age: 31
End: 2021-10-12
Payer: COMMERCIAL

## 2021-10-12 ENCOUNTER — APPOINTMENT (OUTPATIENT)
Dept: PULMONOLOGY | Facility: CLINIC | Age: 31
End: 2021-10-12

## 2021-10-12 PROCEDURE — 99203 OFFICE O/P NEW LOW 30 MIN: CPT | Mod: 95

## 2021-10-12 NOTE — HISTORY OF PRESENT ILLNESS
[Home] : at home, [unfilled] , at the time of the visit. [Medical Office: ([facility])___] : at the medical office located in  [Verbal consent obtained from patient] : the patient, [unfilled] [TextBox_4] : Initial Visit. Telehealth\par \par 32 y/o man, DM I, had breakthrough COVID infection in end of August. Did well. Did not receive MAB\par Overall, recovered, but has a persistent dry cough.\par Worse with activity. Sometimes sob when cough is bad. And when wearing a mask.\par Worse at night - he works nights at [employer].\par He checks his pulse ox -- 97-98%\par \par He had asthma as a child. "outgrew" it\par Does have albuterol inhaler -- has been using it at times, thinks it help somewhat.\par \par Has flonase -- used it when acutely ill, but then stopped

## 2021-10-12 NOTE — DISCUSSION/SUMMARY
[FreeTextEntry1] : On video, pt appears well, speaking clearly, no distress. Intermittent cough, with talking\par \par Persistent cough almost 2 months post breakthrough COVID case. prior h/o asthma.\par \par Trial of Advair 250, prn albuterol. And restart flonase bid for likely upper airway component as well\par \par If not improved in a few weeks -- in person evaluation with PFT.\par \par Pt vaccinated with Moderna in Februray. DM 1\par \par Pt is eligible for a booster , as well - though not urgent since he had COVID <2 mo ago\par If after the 3 months Moderna has been approved as a booster, recommend that he get\par If there is still no Moderna option at that time, I would recommend Pfizer

## 2021-10-18 ENCOUNTER — APPOINTMENT (OUTPATIENT)
Dept: PSYCHIATRY | Facility: CLINIC | Age: 31
End: 2021-10-18
Payer: COMMERCIAL

## 2021-10-18 PROCEDURE — 90837 PSYTX W PT 60 MINUTES: CPT

## 2021-10-25 ENCOUNTER — APPOINTMENT (OUTPATIENT)
Dept: PSYCHIATRY | Facility: CLINIC | Age: 31
End: 2021-10-25
Payer: COMMERCIAL

## 2021-10-25 PROCEDURE — 90837 PSYTX W PT 60 MINUTES: CPT

## 2021-10-26 ENCOUNTER — APPOINTMENT (OUTPATIENT)
Dept: ENDOCRINOLOGY | Facility: CLINIC | Age: 31
End: 2021-10-26
Payer: COMMERCIAL

## 2021-10-26 ENCOUNTER — RESULT CHARGE (OUTPATIENT)
Age: 31
End: 2021-10-26

## 2021-10-26 VITALS — HEIGHT: 69 IN | BODY MASS INDEX: 37.03 KG/M2 | WEIGHT: 250 LBS

## 2021-10-26 LAB — HBA1C MFR BLD HPLC: 6.6

## 2021-10-26 PROCEDURE — G0108 DIAB MANAGE TRN  PER INDIV: CPT

## 2021-10-27 ENCOUNTER — APPOINTMENT (OUTPATIENT)
Dept: PSYCHIATRY | Facility: CLINIC | Age: 31
End: 2021-10-27
Payer: COMMERCIAL

## 2021-10-27 PROCEDURE — 99214 OFFICE O/P EST MOD 30 MIN: CPT

## 2021-11-01 ENCOUNTER — APPOINTMENT (OUTPATIENT)
Dept: PSYCHIATRY | Facility: CLINIC | Age: 31
End: 2021-11-01

## 2021-11-08 ENCOUNTER — APPOINTMENT (OUTPATIENT)
Dept: PSYCHIATRY | Facility: CLINIC | Age: 31
End: 2021-11-08
Payer: COMMERCIAL

## 2021-11-08 PROCEDURE — 90837 PSYTX W PT 60 MINUTES: CPT

## 2021-11-15 ENCOUNTER — APPOINTMENT (OUTPATIENT)
Dept: PSYCHIATRY | Facility: CLINIC | Age: 31
End: 2021-11-15
Payer: COMMERCIAL

## 2021-11-15 PROCEDURE — 90837 PSYTX W PT 60 MINUTES: CPT

## 2021-11-22 ENCOUNTER — APPOINTMENT (OUTPATIENT)
Dept: PSYCHIATRY | Facility: CLINIC | Age: 31
End: 2021-11-22
Payer: COMMERCIAL

## 2021-11-22 ENCOUNTER — APPOINTMENT (OUTPATIENT)
Dept: ENDOCRINOLOGY | Facility: CLINIC | Age: 31
End: 2021-11-22
Payer: COMMERCIAL

## 2021-11-22 VITALS
WEIGHT: 250 LBS | OXYGEN SATURATION: 98 % | HEART RATE: 82 BPM | BODY MASS INDEX: 37.03 KG/M2 | DIASTOLIC BLOOD PRESSURE: 84 MMHG | HEIGHT: 69 IN | SYSTOLIC BLOOD PRESSURE: 124 MMHG | TEMPERATURE: 97.3 F

## 2021-11-22 PROCEDURE — 99213 OFFICE O/P EST LOW 20 MIN: CPT | Mod: 25

## 2021-11-22 PROCEDURE — 95251 CONT GLUC MNTR ANALYSIS I&R: CPT

## 2021-11-22 PROCEDURE — 90837 PSYTX W PT 60 MINUTES: CPT

## 2021-11-29 ENCOUNTER — APPOINTMENT (OUTPATIENT)
Dept: PSYCHIATRY | Facility: CLINIC | Age: 31
End: 2021-11-29
Payer: COMMERCIAL

## 2021-11-29 PROCEDURE — 99214 OFFICE O/P EST MOD 30 MIN: CPT

## 2021-11-29 PROCEDURE — 90837 PSYTX W PT 60 MINUTES: CPT

## 2021-12-03 ENCOUNTER — TRANSCRIPTION ENCOUNTER (OUTPATIENT)
Age: 31
End: 2021-12-03

## 2021-12-03 LAB
25(OH)D3 SERPL-MCNC: 32.9 NG/ML
ALBUMIN SERPL ELPH-MCNC: 4.7 G/DL
ALP BLD-CCNC: 73 U/L
ALT SERPL-CCNC: 23 U/L
ANION GAP SERPL CALC-SCNC: 13 MMOL/L
AST SERPL-CCNC: 23 U/L
BASOPHILS # BLD AUTO: 0.03 K/UL
BASOPHILS NFR BLD AUTO: 0.4 %
BILIRUB SERPL-MCNC: 0.7 MG/DL
BUN SERPL-MCNC: 15 MG/DL
CALCIUM SERPL-MCNC: 10 MG/DL
CHLORIDE SERPL-SCNC: 99 MMOL/L
CHOLEST SERPL-MCNC: 200 MG/DL
CO2 SERPL-SCNC: 27 MMOL/L
CREAT SERPL-MCNC: 0.92 MG/DL
CREAT SPEC-SCNC: 182 MG/DL
EOSINOPHIL # BLD AUTO: 0.08 K/UL
EOSINOPHIL NFR BLD AUTO: 1.2 %
ESTIMATED AVERAGE GLUCOSE: 148 MG/DL
GLUCOSE SERPL-MCNC: 149 MG/DL
HBA1C MFR BLD HPLC: 6.8 %
HCT VFR BLD CALC: 48 %
HDLC SERPL-MCNC: 64 MG/DL
HGB BLD-MCNC: 16.3 G/DL
IMM GRANULOCYTES NFR BLD AUTO: 0.1 %
LDLC SERPL CALC-MCNC: 123 MG/DL
LYMPHOCYTES # BLD AUTO: 1.48 K/UL
LYMPHOCYTES NFR BLD AUTO: 22.1 %
MAN DIFF?: NORMAL
MCHC RBC-ENTMCNC: 28.4 PG
MCHC RBC-ENTMCNC: 34 GM/DL
MCV RBC AUTO: 83.6 FL
MICROALBUMIN 24H UR DL<=1MG/L-MCNC: <1.2 MG/DL
MICROALBUMIN/CREAT 24H UR-RTO: NORMAL MG/G
MONOCYTES # BLD AUTO: 0.64 K/UL
MONOCYTES NFR BLD AUTO: 9.6 %
NEUTROPHILS # BLD AUTO: 4.45 K/UL
NEUTROPHILS NFR BLD AUTO: 66.6 %
NONHDLC SERPL-MCNC: 136 MG/DL
PLATELET # BLD AUTO: 288 K/UL
POTASSIUM SERPL-SCNC: 4.5 MMOL/L
PROT SERPL-MCNC: 7.3 G/DL
RBC # BLD: 5.74 M/UL
RBC # FLD: 12.4 %
SODIUM SERPL-SCNC: 139 MMOL/L
T4 FREE SERPL-MCNC: 1.4 NG/DL
TRIGL SERPL-MCNC: 64 MG/DL
TSH SERPL-ACNC: 3.54 UIU/ML
WBC # FLD AUTO: 6.69 K/UL

## 2021-12-06 ENCOUNTER — APPOINTMENT (OUTPATIENT)
Dept: PSYCHIATRY | Facility: CLINIC | Age: 31
End: 2021-12-06
Payer: COMMERCIAL

## 2021-12-06 PROCEDURE — 90837 PSYTX W PT 60 MINUTES: CPT

## 2021-12-13 ENCOUNTER — APPOINTMENT (OUTPATIENT)
Dept: PSYCHIATRY | Facility: CLINIC | Age: 31
End: 2021-12-13
Payer: COMMERCIAL

## 2021-12-13 PROCEDURE — 90837 PSYTX W PT 60 MINUTES: CPT

## 2021-12-20 ENCOUNTER — APPOINTMENT (OUTPATIENT)
Dept: PSYCHIATRY | Facility: CLINIC | Age: 31
End: 2021-12-20

## 2022-01-03 ENCOUNTER — APPOINTMENT (OUTPATIENT)
Dept: PSYCHIATRY | Facility: CLINIC | Age: 32
End: 2022-01-03

## 2022-01-07 ENCOUNTER — TRANSCRIPTION ENCOUNTER (OUTPATIENT)
Age: 32
End: 2022-01-07

## 2022-01-10 ENCOUNTER — APPOINTMENT (OUTPATIENT)
Dept: PSYCHIATRY | Facility: CLINIC | Age: 32
End: 2022-01-10
Payer: COMMERCIAL

## 2022-01-10 PROCEDURE — 99214 OFFICE O/P EST MOD 30 MIN: CPT | Mod: 95

## 2022-01-10 PROCEDURE — 90837 PSYTX W PT 60 MINUTES: CPT | Mod: 95

## 2022-01-24 ENCOUNTER — APPOINTMENT (OUTPATIENT)
Dept: PSYCHIATRY | Facility: CLINIC | Age: 32
End: 2022-01-24
Payer: COMMERCIAL

## 2022-01-24 PROCEDURE — 90837 PSYTX W PT 60 MINUTES: CPT | Mod: 95

## 2022-01-31 ENCOUNTER — APPOINTMENT (OUTPATIENT)
Dept: PSYCHIATRY | Facility: CLINIC | Age: 32
End: 2022-01-31
Payer: COMMERCIAL

## 2022-01-31 PROCEDURE — 90837 PSYTX W PT 60 MINUTES: CPT | Mod: 95

## 2022-02-07 ENCOUNTER — APPOINTMENT (OUTPATIENT)
Dept: PSYCHIATRY | Facility: CLINIC | Age: 32
End: 2022-02-07
Payer: COMMERCIAL

## 2022-02-07 PROCEDURE — 90837 PSYTX W PT 60 MINUTES: CPT | Mod: 95

## 2022-02-14 ENCOUNTER — APPOINTMENT (OUTPATIENT)
Dept: PSYCHIATRY | Facility: CLINIC | Age: 32
End: 2022-02-14
Payer: COMMERCIAL

## 2022-02-14 PROCEDURE — 90837 PSYTX W PT 60 MINUTES: CPT | Mod: 95

## 2022-02-28 ENCOUNTER — APPOINTMENT (OUTPATIENT)
Dept: PSYCHIATRY | Facility: CLINIC | Age: 32
End: 2022-02-28
Payer: COMMERCIAL

## 2022-02-28 PROCEDURE — 90837 PSYTX W PT 60 MINUTES: CPT | Mod: 95

## 2022-03-07 ENCOUNTER — APPOINTMENT (OUTPATIENT)
Dept: PSYCHIATRY | Facility: CLINIC | Age: 32
End: 2022-03-07
Payer: COMMERCIAL

## 2022-03-07 PROCEDURE — 90837 PSYTX W PT 60 MINUTES: CPT | Mod: 95

## 2022-03-14 ENCOUNTER — APPOINTMENT (OUTPATIENT)
Dept: PSYCHIATRY | Facility: CLINIC | Age: 32
End: 2022-03-14
Payer: COMMERCIAL

## 2022-03-14 PROCEDURE — 90837 PSYTX W PT 60 MINUTES: CPT | Mod: 95

## 2022-03-21 ENCOUNTER — APPOINTMENT (OUTPATIENT)
Dept: PSYCHIATRY | Facility: CLINIC | Age: 32
End: 2022-03-21
Payer: COMMERCIAL

## 2022-03-21 PROCEDURE — 90837 PSYTX W PT 60 MINUTES: CPT | Mod: 95

## 2022-03-28 ENCOUNTER — APPOINTMENT (OUTPATIENT)
Dept: PSYCHIATRY | Facility: CLINIC | Age: 32
End: 2022-03-28

## 2022-03-28 ENCOUNTER — APPOINTMENT (OUTPATIENT)
Dept: PSYCHIATRY | Facility: CLINIC | Age: 32
End: 2022-03-28
Payer: COMMERCIAL

## 2022-03-28 PROCEDURE — 99214 OFFICE O/P EST MOD 30 MIN: CPT | Mod: 95

## 2022-04-04 ENCOUNTER — APPOINTMENT (OUTPATIENT)
Dept: PSYCHIATRY | Facility: CLINIC | Age: 32
End: 2022-04-04
Payer: COMMERCIAL

## 2022-04-04 PROCEDURE — 90837 PSYTX W PT 60 MINUTES: CPT | Mod: 95

## 2022-04-11 ENCOUNTER — APPOINTMENT (OUTPATIENT)
Dept: PSYCHIATRY | Facility: CLINIC | Age: 32
End: 2022-04-11
Payer: COMMERCIAL

## 2022-04-11 PROCEDURE — 90837 PSYTX W PT 60 MINUTES: CPT | Mod: 95

## 2022-04-18 ENCOUNTER — APPOINTMENT (OUTPATIENT)
Dept: PSYCHIATRY | Facility: CLINIC | Age: 32
End: 2022-04-18

## 2022-04-19 ENCOUNTER — APPOINTMENT (OUTPATIENT)
Dept: ENDOCRINOLOGY | Facility: CLINIC | Age: 32
End: 2022-04-19
Payer: COMMERCIAL

## 2022-04-19 VITALS
WEIGHT: 250 LBS | HEART RATE: 88 BPM | DIASTOLIC BLOOD PRESSURE: 80 MMHG | TEMPERATURE: 98.3 F | HEIGHT: 69 IN | SYSTOLIC BLOOD PRESSURE: 124 MMHG | BODY MASS INDEX: 37.03 KG/M2 | OXYGEN SATURATION: 98 %

## 2022-04-19 LAB
GLUCOSE BLDC GLUCOMTR-MCNC: 186
HBA1C MFR BLD HPLC: 6.9

## 2022-04-19 PROCEDURE — 83036 HEMOGLOBIN GLYCOSYLATED A1C: CPT | Mod: QW

## 2022-04-19 PROCEDURE — 95251 CONT GLUC MNTR ANALYSIS I&R: CPT

## 2022-04-19 PROCEDURE — 82962 GLUCOSE BLOOD TEST: CPT

## 2022-04-19 PROCEDURE — 99214 OFFICE O/P EST MOD 30 MIN: CPT | Mod: 25

## 2022-04-22 ENCOUNTER — TRANSCRIPTION ENCOUNTER (OUTPATIENT)
Age: 32
End: 2022-04-22

## 2022-04-22 LAB
ALBUMIN SERPL ELPH-MCNC: 4.7 G/DL
ALP BLD-CCNC: 84 U/L
ALT SERPL-CCNC: 22 U/L
ANION GAP SERPL CALC-SCNC: 11 MMOL/L
AST SERPL-CCNC: 22 U/L
BILIRUB SERPL-MCNC: 0.6 MG/DL
BUN SERPL-MCNC: 14 MG/DL
CALCIUM SERPL-MCNC: 10.1 MG/DL
CHLORIDE SERPL-SCNC: 100 MMOL/L
CHOLEST SERPL-MCNC: 223 MG/DL
CO2 SERPL-SCNC: 30 MMOL/L
CREAT SERPL-MCNC: 0.93 MG/DL
EGFR: 111 ML/MIN/1.73M2
GLUCOSE SERPL-MCNC: 189 MG/DL
HDLC SERPL-MCNC: 66 MG/DL
LDLC SERPL CALC-MCNC: 141 MG/DL
NONHDLC SERPL-MCNC: 157 MG/DL
POTASSIUM SERPL-SCNC: 5.3 MMOL/L
PROT SERPL-MCNC: 7.3 G/DL
SODIUM SERPL-SCNC: 141 MMOL/L
TRIGL SERPL-MCNC: 77 MG/DL

## 2022-04-22 RX ORDER — INSULIN GLARGINE 100 [IU]/ML
100 INJECTION, SOLUTION SUBCUTANEOUS
Qty: 2 | Refills: 0 | Status: ACTIVE | COMMUNITY
Start: 2022-04-22 | End: 1900-01-01

## 2022-04-22 RX ORDER — INSULIN GLARGINE 100 [IU]/ML
100 INJECTION, SOLUTION SUBCUTANEOUS
Qty: 2 | Refills: 1 | Status: DISCONTINUED | COMMUNITY
Start: 2022-04-19 | End: 2022-04-22

## 2022-04-22 RX ORDER — INSULIN LISPRO 100 [IU]/ML
100 INJECTION, SOLUTION INTRAVENOUS; SUBCUTANEOUS
Qty: 4 | Refills: 1 | Status: ACTIVE | COMMUNITY
Start: 2022-04-19 | End: 1900-01-01

## 2022-04-22 RX ORDER — PEN NEEDLE, DIABETIC 29 G X1/2"
32G X 4 MM NEEDLE, DISPOSABLE MISCELLANEOUS
Qty: 2 | Refills: 1 | Status: ACTIVE | COMMUNITY
Start: 2022-04-22 | End: 1900-01-01

## 2022-04-25 ENCOUNTER — APPOINTMENT (OUTPATIENT)
Dept: PSYCHIATRY | Facility: CLINIC | Age: 32
End: 2022-04-25
Payer: COMMERCIAL

## 2022-04-25 PROCEDURE — 90837 PSYTX W PT 60 MINUTES: CPT | Mod: 95

## 2022-05-02 ENCOUNTER — APPOINTMENT (OUTPATIENT)
Dept: PSYCHIATRY | Facility: CLINIC | Age: 32
End: 2022-05-02
Payer: COMMERCIAL

## 2022-05-02 PROCEDURE — 90837 PSYTX W PT 60 MINUTES: CPT | Mod: 95

## 2022-05-09 ENCOUNTER — APPOINTMENT (OUTPATIENT)
Dept: PSYCHIATRY | Facility: CLINIC | Age: 32
End: 2022-05-09
Payer: COMMERCIAL

## 2022-05-09 PROCEDURE — 90837 PSYTX W PT 60 MINUTES: CPT | Mod: 95

## 2022-05-16 ENCOUNTER — APPOINTMENT (OUTPATIENT)
Dept: PSYCHIATRY | Facility: CLINIC | Age: 32
End: 2022-05-16
Payer: COMMERCIAL

## 2022-05-16 PROCEDURE — 90837 PSYTX W PT 60 MINUTES: CPT | Mod: 95

## 2022-06-20 ENCOUNTER — APPOINTMENT (OUTPATIENT)
Dept: PSYCHIATRY | Facility: CLINIC | Age: 32
End: 2022-06-20
Payer: COMMERCIAL

## 2022-06-20 PROCEDURE — 90837 PSYTX W PT 60 MINUTES: CPT | Mod: 95

## 2022-06-20 PROCEDURE — 99214 OFFICE O/P EST MOD 30 MIN: CPT | Mod: 95

## 2022-07-11 ENCOUNTER — APPOINTMENT (OUTPATIENT)
Dept: PSYCHIATRY | Facility: CLINIC | Age: 32
End: 2022-07-11

## 2022-07-11 PROCEDURE — 90837 PSYTX W PT 60 MINUTES: CPT | Mod: 95

## 2022-07-18 ENCOUNTER — APPOINTMENT (OUTPATIENT)
Dept: PSYCHIATRY | Facility: CLINIC | Age: 32
End: 2022-07-18

## 2022-07-25 ENCOUNTER — APPOINTMENT (OUTPATIENT)
Dept: PSYCHIATRY | Facility: CLINIC | Age: 32
End: 2022-07-25

## 2022-08-01 ENCOUNTER — APPOINTMENT (OUTPATIENT)
Dept: PSYCHIATRY | Facility: CLINIC | Age: 32
End: 2022-08-01

## 2022-08-08 ENCOUNTER — APPOINTMENT (OUTPATIENT)
Dept: PSYCHIATRY | Facility: CLINIC | Age: 32
End: 2022-08-08

## 2022-08-08 PROCEDURE — 90837 PSYTX W PT 60 MINUTES: CPT | Mod: 95

## 2022-08-22 ENCOUNTER — APPOINTMENT (OUTPATIENT)
Dept: PSYCHIATRY | Facility: CLINIC | Age: 32
End: 2022-08-22

## 2022-08-22 PROCEDURE — 90837 PSYTX W PT 60 MINUTES: CPT | Mod: 95

## 2022-08-29 ENCOUNTER — APPOINTMENT (OUTPATIENT)
Dept: PSYCHIATRY | Facility: CLINIC | Age: 32
End: 2022-08-29

## 2022-09-12 ENCOUNTER — APPOINTMENT (OUTPATIENT)
Dept: PSYCHIATRY | Facility: CLINIC | Age: 32
End: 2022-09-12

## 2022-09-12 PROCEDURE — 90837 PSYTX W PT 60 MINUTES: CPT | Mod: 95

## 2022-09-26 ENCOUNTER — APPOINTMENT (OUTPATIENT)
Dept: PSYCHIATRY | Facility: CLINIC | Age: 32
End: 2022-09-26

## 2022-09-26 PROCEDURE — 90837 PSYTX W PT 60 MINUTES: CPT | Mod: 95

## 2022-10-04 ENCOUNTER — APPOINTMENT (OUTPATIENT)
Dept: ENDOCRINOLOGY | Facility: CLINIC | Age: 32
End: 2022-10-04

## 2022-10-10 ENCOUNTER — APPOINTMENT (OUTPATIENT)
Dept: PSYCHIATRY | Facility: CLINIC | Age: 32
End: 2022-10-10

## 2022-10-13 PROBLEM — Z13.31 SCREENING FOR DEPRESSION: Status: RESOLVED | Noted: 2021-06-21 | Resolved: 2021-06-23

## 2022-10-18 ENCOUNTER — APPOINTMENT (OUTPATIENT)
Dept: PSYCHIATRY | Facility: CLINIC | Age: 32
End: 2022-10-18

## 2022-10-18 PROCEDURE — 99214 OFFICE O/P EST MOD 30 MIN: CPT | Mod: 95

## 2022-10-18 NOTE — PLAN
[No] : No [Medication education provided] : Medication education provided. [Rationale for medication choices, possible risks/precautions, benefits, alternative treatment choices, and consequences of non-treatment discussed] : Rationale for medication choices, possible risks/precautions, benefits, alternative treatment choices, and consequences of non-treatment discussed with patient/family/caregiver  [FreeTextEntry5] : Psychoeducation and supportive therapy provided,  and discussed rationale for recommended meds, continue to monitor for stability \par Behavior activation\par Sleep hygiene education\par Medication:fluoxetine 30 mg/day\par Educated patient of importance of remaining abstinent from drugs and alcohol. \par Emergency procedures were discussed: pt. educated to call 911 or go to nearest ER for worsening of symptoms/suicidal/homicidal ideation. \par Continue for individual psychotherapy to learn coping skills \par RTC in 3 months or earlier as needed \par Patient given opportunity to ask questions and their questions were answered and they expressed understanding and agreement with above plan.\par

## 2022-10-18 NOTE — PHYSICAL EXAM
[None] : none [Cooperative] : cooperative [Euthymic] : euthymic [Full] : full [Clear] : clear [Linear/Goal Directed] : linear/goal directed [WNL] : within normal limits

## 2022-10-18 NOTE — DISCUSSION/SUMMARY
[FreeTextEntry1] : The patient is a 32 yo man with hx of PANDAS syndrome diagnosed at age 4, with hx of sx consistent with OCD, primarily obsessions and generalized anxiety disorder, presents with increasing anxiety for past 6 months \par \par 10/27/2021: Patient states with the help of medication and therapy he is able to manage his anxiety, intrusive thoughts and compulsions better and wants to stay at the current dose and continue to monitor for further improvement of the symptoms.\par 11/29/2021: Patient states there is reduction in general anxiety and OCD sx since starting Prozac, but no incremental improvement since last visit, will increase dose for increased therapeutic benefit. \par 1/10/2022: Patient reports reduction in irritability and also not double checking things, still periodically has increase in anxiety feels overall with the current dose of Prozac he is able to cope with general anxiety well and using his coping skills effectively.\par 3/28/2022: Patient reports with therapy and medications he feels that his OCD and general anxiety symptoms are much better controlled on current Prozac dose and with weekly therapy  \par 6/20/2022: Patient reports that he is doing well, reports sustained improvement of general anxiety and OCD symptoms, and feels both the therapy and medications are helping.  He was concerned that maybe the recent forgetfulness/misplacing things may be is a side effect from medication, reviewed medication side effects and the current symptoms that he is experiencing less likely related to medications or in general any anxiety symptoms, he will continue to monitor.\par 10/18/2022: The patient reported that he is continuing to do very well, her with sustained improvement of general anxiety and OCD symptoms.\par

## 2022-10-18 NOTE — HISTORY OF PRESENT ILLNESS
[Medical Office: ([facility])___] : at the medical office located in  [Verbal consent obtained from patient] : the patient, [unfilled] [Home] : at home, [unfilled] , at the time of the visit. [de-identified] : Patient reports he is doing good since last visit. States he got  in May and just came back from his honeymoon, and was off for almost a month will be going back to work in 2 days, He states he is still jet lagged, but in general sleep and appetite are good. He reports he did not get anxious before the wedding and his wife was also calm and they had a good wedding and enjoyed their honeymoon. He states he had misplaced a few things, in the past couple of weeks, including not putting on his insulin pump in the morning, but denies problems remembering other things such as names conversations dates appointments etc.  \par Patient reports his general anxiety and the OCD symptoms are in good control and feels both meds and individual therapy are helping\par Reports adherence to med regimen\par Denies excess ETOH use/abuse. \par Denies cannabis and other substance use/abuse [FreeTextEntry1] : The patient reported that he is "status quo".  Patient reported that he feels the medication regimen he is currently on, fluoxetine 30 mg daily and the individual therapy sessions are keeping him stable.  Patient reported that he no longer feels tensed, on edge and also denied excessive worrying.  Patient reported that he feels more relaxed and when he does get irritable "it is like any normal human being" and not excess and he is able to cope with the stress and manage his irritability a lot better.  Patient reported that the OCD symptoms are also in good control.  Patient reported that sleep and appetite are good.  \par No new medical issues, no new medication since last visit\par He denied excess ETOH use/abuse. \par He denied cannabis and other substance use/abuse\par

## 2022-10-24 ENCOUNTER — APPOINTMENT (OUTPATIENT)
Dept: PSYCHIATRY | Facility: CLINIC | Age: 32
End: 2022-10-24

## 2022-10-24 PROCEDURE — 90837 PSYTX W PT 60 MINUTES: CPT | Mod: 95

## 2022-10-25 ENCOUNTER — APPOINTMENT (OUTPATIENT)
Dept: DERMATOLOGY | Facility: CLINIC | Age: 32
End: 2022-10-25

## 2022-10-25 ENCOUNTER — TRANSCRIPTION ENCOUNTER (OUTPATIENT)
Age: 32
End: 2022-10-25

## 2022-10-25 PROCEDURE — 99214 OFFICE O/P EST MOD 30 MIN: CPT

## 2022-10-25 RX ORDER — BENZOYL PEROXIDE 100 MG/ML
10 LIQUID TOPICAL DAILY
Qty: 1 | Refills: 6 | Status: ACTIVE | COMMUNITY
Start: 2021-06-22 | End: 1900-01-01

## 2022-10-25 NOTE — BEHAVIORAL HEALTH
[No] : no [FreeTextEntry2] : Enhance ability to effectively cope with the full variety of life's anxieties.\par Resolve the core conflict that is the source of anxiety.\par Patient will continue to utilize strategies to manage obsessive thinking.\par Patient will refrain from impulsive/compulsive behaviors.\par Patient will utilize reviewed cognitive strategies to manage anxiety.\par \par  [Cognitive and/or Behavior Therapy] : Cognitive and/or Behavior Therapy  [[facility]] : [facility]  [Integrative Therapy] : Integrative Therapy  [Supportive Therapy] : Supportive Therapy [de-identified] : Patient was seen for a 55 minute Telehealth therapy session. Patient reported he has be caring for his mother and mother-in-law both had surgeries and he is taking them to their doctor's appointment. Patient is gaining insight into his family dynamics and how this affected her self confidence.  He is enjoying  life and feels they work as a team and he feels connected.  Patient stated he has been eating healthy an has begun an exercised regiment.  He has been able to relax and enjoy himself without obsessing. Patient is learning self care and how to separate work and personal time. Patient will begin every other biweekly sessions. Utilized cognitive restructuring and mindfulness concepts to address worries about the future. Discussed the importance of self-care and identified activities, including attending medical appointments and engaging in socialization activities.\par \par \par

## 2022-10-25 NOTE — REASON FOR VISIT
[Home] : at home, [unfilled] , at the time of the visit. [Other Location: e.g. Home (Enter Location, City,State)___] : at [unfilled] [Patient] : Patient [FreeTextEntry1] : Anxiety/Depression

## 2022-10-25 NOTE — PLAN
[FreeTextEntry2] : Enhance ability to effectively cope with the full variety of life's anxieties.\par Resolve the core conflict that is the source of anxiety.\par Patient will continue to utilize strategies to manage obsessive thinking.\par Patient will refrain from impulsive/compulsive behaviors.\par Patient will utilize reviewed cognitive strategies to manage anxiety.\par  [Cognitive and/or Behavior Therapy] : Cognitive and/or Behavior Therapy  [Integrative Therapy] : Integrative Therapy  [Psychoeducation] : Psychoeducation  [Supportive Therapy] : Supportive Therapy [de-identified] : Patient was seen for a 55 minute Telehealth therapy session. Patient reported he continues to feel calmer and more patient, this is helping him with all of his relationships and is making him more peaceful.  He continues to gain insight into his thoughts and feelings and is able to regulate his emotions. He is enjoying  life and feels they work as a team and he feels connected.  Patient stated he has been eating healthy an has begun an exercised regiment.   Patient has been more confident in himself and this is helping him to feel less anxious. He has been able to relax and enjoy himself without obsessing. Patient is learning self care and how to separate work and personal time. Patient will begin every other biweekly sessions. Utilized cognitive restructuring and mindfulness concepts to address worries about the future. Discussed the importance of self-care and identified activities, including attending medical appointments and engaging in socialization activities.\par \par \par  [FreeTextEntry1] : Patient will attend weekly session to learn new coping strategies to decrease anxiety and depression

## 2022-10-25 NOTE — PHYSICAL EXAM
[Average] : average [Cooperative] : cooperative [Depressed] : depressed [Anxious] : anxious [Full] : full [Clear] : clear [Linear/Goal Directed] : linear/goal directed [Above average] : above average [WNL] : within normal limits

## 2022-10-31 ENCOUNTER — NON-APPOINTMENT (OUTPATIENT)
Age: 32
End: 2022-10-31

## 2022-10-31 DIAGNOSIS — L30.9 DERMATITIS, UNSPECIFIED: ICD-10-CM

## 2022-10-31 RX ORDER — TRIAMCINOLONE ACETONIDE 1 MG/G
0.1 OINTMENT TOPICAL
Qty: 1 | Refills: 1 | Status: ACTIVE | COMMUNITY
Start: 2022-10-31 | End: 1900-01-01

## 2022-11-01 ENCOUNTER — APPOINTMENT (OUTPATIENT)
Dept: ENDOCRINOLOGY | Facility: CLINIC | Age: 32
End: 2022-11-01

## 2022-11-01 VITALS
BODY MASS INDEX: 37.03 KG/M2 | DIASTOLIC BLOOD PRESSURE: 80 MMHG | HEIGHT: 69 IN | SYSTOLIC BLOOD PRESSURE: 122 MMHG | OXYGEN SATURATION: 98 % | HEART RATE: 75 BPM | WEIGHT: 250 LBS | TEMPERATURE: 98.2 F

## 2022-11-01 LAB — HBA1C MFR BLD HPLC: 6.5

## 2022-11-01 PROCEDURE — 83036 HEMOGLOBIN GLYCOSYLATED A1C: CPT | Mod: QW

## 2022-11-01 PROCEDURE — 99214 OFFICE O/P EST MOD 30 MIN: CPT | Mod: 25

## 2022-11-01 PROCEDURE — 95251 CONT GLUC MNTR ANALYSIS I&R: CPT

## 2022-11-01 RX ORDER — URINE ACETONE TEST STRIPS
STRIP MISCELLANEOUS
Qty: 1 | Refills: 1 | Status: ACTIVE | COMMUNITY
Start: 2022-11-01 | End: 1900-01-01

## 2022-11-01 NOTE — REVIEW OF SYSTEMS
[Anxiety] : anxiety [Depression] : depression [All other systems negative] : All other systems negative [Negative] : Heme/Lymph [Fatigue] : no fatigue [Decreased Appetite] : appetite not decreased [Recent Weight Gain (___ Lbs)] : no recent weight gain [Recent Weight Loss (___ Lbs)] : no recent weight loss [Visual Field Defect] : no visual field defect [Dry Eyes] : no dryness [Dysphagia] : no dysphagia [Neck Pain] : no neck pain [Dysphonia] : no dysphonia [Nasal Congestion] : no nasal congestion [Chest Pain] : no chest pain [Slow Heart Rate] : heart rate is not slow [Palpitations] : no palpitations [Fast Heart Rate] : heart rate is not fast [Shortness Of Breath] : no shortness of breath [Cough] : no cough [Nausea] : no nausea [Constipation] : no constipation [Vomiting] : no vomiting [Diarrhea] : no diarrhea [Polyuria] : no polyuria [Hesistancy] : no hesitancy [Joint Pain] : no joint pain [Headaches] : no headaches [Dizziness] : no dizziness [Tremors] : no tremors [Pain/Numbness of Digits] : no pain/numbness of digits [Polydipsia] : no polydipsia [Cold Intolerance] : no cold intolerance [Easy Bleeding] : no ~M tendency for easy bleeding [Easy Bruising] : no tendency for easy bruising

## 2022-11-01 NOTE — PHYSICAL EXAM
[Alert] : alert [Well Nourished] : well nourished [Healthy Appearance] : healthy appearance [No Acute Distress] : no acute distress [Well Developed] : well developed [Normal Sclera/Conjunctiva] : normal sclera/conjunctiva [EOMI] : extra ocular movement intact [PERRL] : pupils equal, round and reactive to light [Normal Outer Ear/Nose] : the ears and nose were normal in appearance [Normal Hearing] : hearing was normal [Normal TMs] : both tympanic membranes were normal [Thyroid Not Enlarged] : the thyroid was not enlarged [No Thyroid Nodules] : no palpable thyroid nodules [No Respiratory Distress] : no respiratory distress [No Accessory Muscle Use] : no accessory muscle use [Normal Rate and Effort] : normal respiratory rate and effort [Clear to Auscultation] : lungs were clear to auscultation bilaterally [Normal S1, S2] : normal S1 and S2 [Normal Rate] : heart rate was normal [Regular Rhythm] : with a regular rhythm [No Edema] : no peripheral edema [Normal Bowel Sounds] : normal bowel sounds [Not Tender] : non-tender [Soft] : abdomen soft [Normal Gait] : normal gait [No Clubbing, Cyanosis] : no clubbing  or cyanosis of the fingernails [No Joint Swelling] : no joint swelling seen [Normal Strength/Tone] : muscle strength and tone were normal [No Rash] : no rash [No Skin Lesions] : no skin lesions [No Motor Deficits] : the motor exam was normal [No Tremors] : no tremors [Oriented x3] : oriented to person, place, and time [Normal Affect] : the affect was normal [Normal Insight/Judgement] : insight and judgment were intact [Normal Mood] : the mood was normal [No Involuntary Movements] : no involuntary movements were seen [Right foot was examined, including] : right foot ~C was examined, including visual inspection with sensory and pulse exams [Left foot was examined, including] : left foot ~C was examined, including visual inspection with sensory and pulse exams [Normal] : normal [Full ROM] : with full range of motion [Diminished Throughout Both Feet] : normal tactile sensation with monofilament testing throughout both feet [de-identified] : Left lower quadrant Tandem, Dexcom on LUE

## 2022-11-01 NOTE — HISTORY OF PRESENT ILLNESS
[FreeTextEntry1] : 31 y/o male here for f/u management of T1DM (A1c 6.5%). \par \par DM was diagnosed at age 14. No h/o DKA in the past. \par Complications include: Last ophtho Jan 2022, no retinopathy, + h/o microalbuminuria and resolved. No neuropathy. No MI or CVA\par \par 11/1/22 - reports higher sugars in 300s  before bed, has not changed any diet or anything, may be bolusing later. Works evening 3-11 or 4-12 schedule. Will eat around 7 pm, no hypoglycemia\par No new meds\par \par Current DM Medications/Insulin: Changes site usually every 2.5-3 days and changes Dexcom every 10 days\par Medtronic Tandem T slim with control IQ, started in Aug 2021 and Dexcom G6 Medtronic but did not use guardian sensor so never in auto mode.\par Using Humalog insulin, working well. Boluses 10-15 minutes before eating.\par Does not use exercise mode, uses sleep mode\par \par Settings:\par 12AM 1\par 8AM 1.4\par 5PM 1.2->1.45\par 10PM 1.2->1.4\par total 29 units\par \par ISF\par 12AM 20\par \par ICR\par 12AM 6.6\par 8AM 6.7\par 5PM 6.5->6\par 10PM 6.6->6\par \par \par \par \par Not exercising now, gym closed recently. \par \par Dexcom downloaded today:\par 63% in range, 28% high and 7% very high, 1% low, <1% very low\par \par Going to gym 2x/week 10am-11am (activity mode BGT change to 150) \par Now working 8 hour shifts, 4-12am, evening shifts\par Admits to eating only 2 meals 1pm and 7pm. Will snack after dinner until bedtime 12-1am. Admits to bolusing for snacks too\par Current Diet Includes: mostly eating 2 meals (usually lunch and dinner). States always confident in carb counting\par 60-90 grams for each meal\par Drinks: no soda. Drinks juice if low.\par Works 3-11pm or 4pm-12am at [employer] ([place])\par Sleeps late, awake by 10am\par \par Back-Up:\par Had used pens in the past, has Lantus and Novolog, has it at home. Has glucagon\par Has ketone strips\par \par UTD Flu shot 2022, UTD covid booster 2022\par \par Additional history:\par PMH: Obesity, Vitamin D Deficiency, Mild HLD\par Meds: Vitamin D3 2000 units QD, Prozac 20 mg QD\par FH: Dad and GM with T2DM. Maternal GF and uncle with T2DM \par \par Works at [employer]

## 2022-11-01 NOTE — ASSESSMENT
[Carbohydrate Consistent Diet] : carbohydrate consistent diet [Importance of Diet and Exercise] : importance of diet and exercise to improve glycemic control, achieve weight loss and improve cardiovascular health [Hypoglycemia Management] : hypoglycemia management [Glucagon Use] : glucagon use [Action and use of Insulin] : action and use of short and long-acting insulin [Self Monitoring of Blood Glucose] : self monitoring of blood glucose [Retinopathy Screening] : Patient was referred to ophthalmology for retinopathy screening [FreeTextEntry1] : 33 y/o male with well controlled T1DM (A1c 6.5%) without complications. Also with mild HLD () and vitamin D deficiency. \par \par T1DM, controlled\par - I had a discussion regarding healthy diet (consistent carbohydrates) with the patient. I also emphasized to maintain routine exercise activity as tolerated.\par - See pump donwload -> increased basal rates at 5pm and 10 pm. Noted some overnight borderline lows on 2 nights last week, pt can drop 12am basal rate 0.8 if persists. Made dinner/evening ICR more aggressive, pt will work on bolusing earlier.\par - We discussed that mild hypoglycemia (BG<70mg/dl) should be corrected with 4oz juice or 3 glucose tablets and then recheck glucose in 15 minutes\par - Call back to notify us of highs or lows\par - Uptodate with ophtho\par \par Vitamin D deficiency\par - Continue Vitamin D3 2000 units QD or 10,000 units once/week\par - Levels normal in Nov 2021\par \par Mild HLD\par -  and his ASCVD risk is low\par - We discussed that ADA guidelines suggest statin for all DM patient regardless of LDL given increased risk of MI, age <45. Will recheck lipid profile today\par \par Yearly labs today\par pt has glucagon, ketone strips, and backup insulin pens\par UTD flu and covid booster 2022\par \par \par \par

## 2022-11-07 ENCOUNTER — TRANSCRIPTION ENCOUNTER (OUTPATIENT)
Age: 32
End: 2022-11-07

## 2022-11-07 ENCOUNTER — NON-APPOINTMENT (OUTPATIENT)
Age: 32
End: 2022-11-07

## 2022-11-07 ENCOUNTER — APPOINTMENT (OUTPATIENT)
Dept: PSYCHIATRY | Facility: CLINIC | Age: 32
End: 2022-11-07

## 2022-11-07 LAB
ALBUMIN SERPL ELPH-MCNC: 4.5 G/DL
ALP BLD-CCNC: 72 U/L
ALT SERPL-CCNC: 25 U/L
ANION GAP SERPL CALC-SCNC: 10 MMOL/L
AST SERPL-CCNC: 22 U/L
BILIRUB SERPL-MCNC: 0.5 MG/DL
BUN SERPL-MCNC: 16 MG/DL
CALCIUM SERPL-MCNC: 9.9 MG/DL
CHLORIDE SERPL-SCNC: 101 MMOL/L
CHOLEST SERPL-MCNC: 210 MG/DL
CO2 SERPL-SCNC: 29 MMOL/L
CREAT SERPL-MCNC: 0.94 MG/DL
CREAT SPEC-SCNC: 251 MG/DL
EGFR: 109 ML/MIN/1.73M2
GLUCOSE SERPL-MCNC: 233 MG/DL
HDLC SERPL-MCNC: 60 MG/DL
LDLC SERPL CALC-MCNC: 134 MG/DL
MICROALBUMIN 24H UR DL<=1MG/L-MCNC: <1.2 MG/DL
MICROALBUMIN/CREAT 24H UR-RTO: NORMAL MG/G
NONHDLC SERPL-MCNC: 150 MG/DL
POTASSIUM SERPL-SCNC: 4.9 MMOL/L
PROT SERPL-MCNC: 7.2 G/DL
SODIUM SERPL-SCNC: 140 MMOL/L
TRIGL SERPL-MCNC: 80 MG/DL
TSH SERPL-ACNC: 2.78 UIU/ML
TTG IGA SER IA-ACNC: <1.2 U/ML
TTG IGA SER-ACNC: NEGATIVE
TTG IGG SER IA-ACNC: 1.2 U/ML
TTG IGG SER IA-ACNC: NEGATIVE

## 2022-11-07 PROCEDURE — 90837 PSYTX W PT 60 MINUTES: CPT | Mod: 95

## 2022-11-08 NOTE — BEHAVIORAL HEALTH
[No] : no [FreeTextEntry2] : Enhance ability to effectively cope with the full variety of life's anxieties.\par Resolve the core conflict that is the source of anxiety.\par Patient will continue to utilize strategies to manage obsessive thinking.\par Patient will refrain from impulsive/compulsive behaviors.\par Patient will utilize reviewed cognitive strategies to manage anxiety.\par \par  [Cognitive and/or Behavior Therapy] : Cognitive and/or Behavior Therapy  [[facility]] : [facility]  [Integrative Therapy] : Integrative Therapy  [Supportive Therapy] : Supportive Therapy [de-identified] : Patient was seen for a 55 minute Telehealth therapy session. Patient reported he has be caring for his mother and mother-in-law both had surgeries and he is taking them to their doctor's appointment. Patient is gaining insight into his family dynamics and how this affected her self confidence.  He is enjoying  life and feels they work as a team and he feels connected.  Patient stated he has been eating healthy an has begun an exercised regiment.  He has been able to relax and enjoy himself without obsessing. Patient is learning self care and how to separate work and personal time. Patient will begin every other biweekly sessions. Utilized cognitive restructuring and mindfulness concepts to address worries about the future. Discussed the importance of self-care and identified activities, including attending medical appointments and engaging in socialization activities.\par \par \par

## 2022-11-08 NOTE — PLAN
[FreeTextEntry2] : Enhance ability to effectively cope with the full variety of life's anxieties.\par Resolve the core conflict that is the source of anxiety.\par Patient will continue to utilize strategies to manage obsessive thinking.\par Patient will refrain from impulsive/compulsive behaviors.\par Patient will utilize reviewed cognitive strategies to manage anxiety.\par  [Cognitive and/or Behavior Therapy] : Cognitive and/or Behavior Therapy  [Integrative Therapy] : Integrative Therapy  [Psychoeducation] : Psychoeducation  [Supportive Therapy] : Supportive Therapy [de-identified] : Patient was seen for a 55 minute [facility] therapy session. Patient reported he  has been having more family stress and is learning how to set boundaries..  He continues to gain insight into his thoughts and feelings and is able to regulate his emotions. He is enjoying  life and feels they work as a team and he feels connected. Patient has been more confident in himself and this is helping him to feel less anxious. He has been able to relax and enjoy himself without obsessing. Patient is learning self care and how to separate work and personal time. Patient will begin every other biweekly sessions. Utilized cognitive restructuring and mindfulness concepts to address worries about the future. Discussed the importance of self-care and identified activities, including attending medical appointments and engaging in socialization activities.\par \par \par  [FreeTextEntry1] : Patient will attend weekly session to learn new coping strategies to decrease anxiety and depression

## 2022-11-09 ENCOUNTER — TRANSCRIPTION ENCOUNTER (OUTPATIENT)
Age: 32
End: 2022-11-09

## 2022-11-14 ENCOUNTER — TRANSCRIPTION ENCOUNTER (OUTPATIENT)
Age: 32
End: 2022-11-14

## 2022-11-21 ENCOUNTER — APPOINTMENT (OUTPATIENT)
Dept: PSYCHIATRY | Facility: CLINIC | Age: 32
End: 2022-11-21

## 2022-11-21 PROCEDURE — 90837 PSYTX W PT 60 MINUTES: CPT | Mod: 95

## 2022-11-22 ENCOUNTER — APPOINTMENT (OUTPATIENT)
Dept: DERMATOLOGY | Facility: CLINIC | Age: 32
End: 2022-11-22

## 2022-11-22 NOTE — BEHAVIORAL HEALTH
[No] : no [FreeTextEntry2] : Enhance ability to effectively cope with the full variety of life's anxieties.\par Resolve the core conflict that is the source of anxiety.\par Patient will continue to utilize strategies to manage obsessive thinking.\par Patient will refrain from impulsive/compulsive behaviors.\par Patient will utilize reviewed cognitive strategies to manage anxiety.\par \par  [Cognitive and/or Behavior Therapy] : Cognitive and/or Behavior Therapy  [[facility]] : [facility]  [Integrative Therapy] : Integrative Therapy  [Supportive Therapy] : Supportive Therapy [de-identified] : Patient was seen for a 55 minute Telehealth therapy session. Patient reported he has be caring for his mother and mother-in-law both had surgeries and he is taking them to their doctor's appointment. Patient is gaining insight into his family dynamics and how this affected her self confidence.  He is enjoying  life and feels they work as a team and he feels connected.  Patient stated he has been eating healthy an has begun an exercised regiment.  He has been able to relax and enjoy himself without obsessing. Patient is learning self care and how to separate work and personal time. Patient will begin every other biweekly sessions. Utilized cognitive restructuring and mindfulness concepts to address worries about the future. Discussed the importance of self-care and identified activities, including attending medical appointments and engaging in socialization activities.\par \par \par

## 2022-11-22 NOTE — PLAN
[FreeTextEntry2] : Enhance ability to effectively cope with the full variety of life's anxieties.\par Resolve the core conflict that is the source of anxiety.\par Patient will continue to utilize strategies to manage obsessive thinking.\par Patient will refrain from impulsive/compulsive behaviors.\par Patient will utilize reviewed cognitive strategies to manage anxiety.\par  [Cognitive and/or Behavior Therapy] : Cognitive and/or Behavior Therapy  [Integrative Therapy] : Integrative Therapy  [Psychoeducation] : Psychoeducation  [Supportive Therapy] : Supportive Therapy [de-identified] : Patient was seen for a 55 minute [facility]eled[facility] therapy session. Patient reported he is doing well and is calmer he continues to set boundaries with his family.  He continues to gain insight into his thoughts and feelings and is able to regulate his emotions. He is enjoying  life and feels they work as a team and he feels connected. Patient has been more confident in himself and this is helping him to feel less anxious and is looking to discover other hobbies and opportunities to be challenged.  He has been able to relax and enjoy himself without obsessing. Patient is learning self care and how to separate work and personal time. Utilized cognitive restructuring and mindfulness concepts to address worries about the future. Discussed the importance of self-care and identified activities, including attending medical appointments and engaging in socialization activities.\par \par \par  [FreeTextEntry1] : Patient will attend weekly session to learn new coping strategies to decrease anxiety and depression

## 2022-12-05 ENCOUNTER — APPOINTMENT (OUTPATIENT)
Dept: PSYCHIATRY | Facility: CLINIC | Age: 32
End: 2022-12-05

## 2022-12-05 PROCEDURE — 90837 PSYTX W PT 60 MINUTES: CPT | Mod: 95

## 2022-12-05 NOTE — PLAN
[FreeTextEntry2] : Enhance ability to effectively cope with the full variety of life's anxieties.\par Resolve the core conflict that is the source of anxiety.\par Patient will continue to utilize strategies to manage obsessive thinking.\par Patient will refrain from impulsive/compulsive behaviors.\par Patient will utilize reviewed cognitive strategies to manage anxiety.\par  [Cognitive and/or Behavior Therapy] : Cognitive and/or Behavior Therapy  [Integrative Therapy] : Integrative Therapy  [Psychoeducation] : Psychoeducation  [Supportive Therapy] : Supportive Therapy [de-identified] : Patient was seen for a 55 minute [facility] therapy session. Patient reported he has been feeling a little blue, in session this was explored and patient feels overwhelmed at times as his grandmother, father, mother and mother-in-law depend on him for a lot of things. He is trying to set boundaries with his family but states he feels guilty. He is enjoying  life and feels they work as a team and he feels connected. Patient is looking to discover other hobbies and opportunities to be challenged. Patient is learning self care and how to separate work and personal time. Utilized cognitive restructuring and mindfulness concepts to address worries about the future. Discussed the importance of self-care and identified activities, including attending medical appointments and engaging in socialization activities.\par \par \par  [FreeTextEntry1] : Patient will attend weekly session to learn new coping strategies to decrease anxiety and depression

## 2022-12-05 NOTE — BEHAVIORAL HEALTH
[No] : no [FreeTextEntry2] : Enhance ability to effectively cope with the full variety of life's anxieties.\par Resolve the core conflict that is the source of anxiety.\par Patient will continue to utilize strategies to manage obsessive thinking.\par Patient will refrain from impulsive/compulsive behaviors.\par Patient will utilize reviewed cognitive strategies to manage anxiety.\par \par  [Cognitive and/or Behavior Therapy] : Cognitive and/or Behavior Therapy  [[facility]] : [facility]  [Integrative Therapy] : Integrative Therapy  [Supportive Therapy] : Supportive Therapy [de-identified] : Patient was seen for a 55 minute Telehealth therapy session. Patient reported he has be caring for his mother and mother-in-law both had surgeries and he is taking them to their doctor's appointment. Patient is gaining insight into his family dynamics and how this affected her self confidence.  He is enjoying  life and feels they work as a team and he feels connected.  Patient stated he has been eating healthy an has begun an exercised regiment.  He has been able to relax and enjoy himself without obsessing. Patient is learning self care and how to separate work and personal time. Patient will begin every other biweekly sessions. Utilized cognitive restructuring and mindfulness concepts to address worries about the future. Discussed the importance of self-care and identified activities, including attending medical appointments and engaging in socialization activities.\par \par \par

## 2022-12-19 ENCOUNTER — APPOINTMENT (OUTPATIENT)
Dept: PSYCHIATRY | Facility: CLINIC | Age: 32
End: 2022-12-19

## 2022-12-19 PROCEDURE — 90837 PSYTX W PT 60 MINUTES: CPT | Mod: 95

## 2022-12-20 NOTE — PLAN
[FreeTextEntry2] : Enhance ability to effectively cope with the full variety of life's anxieties.\par Resolve the core conflict that is the source of anxiety.\par Patient will continue to utilize strategies to manage obsessive thinking.\par Patient will refrain from impulsive/compulsive behaviors.\par Patient will utilize reviewed cognitive strategies to manage anxiety.\par  [Cognitive and/or Behavior Therapy] : Cognitive and/or Behavior Therapy  [Integrative Therapy] : Integrative Therapy  [Psychoeducation] : Psychoeducation  [Supportive Therapy] : Supportive Therapy [de-identified] : Patient was seen for a 55 minute Teledoc therapy session. Patient reported he continues to gain insight into his thoughts and behavior and is learning how to regulate his emotions. He is working on how he reacts to issues that affect his feelings. Patient has been more confident in himself and this is helping him to feel less anxious and is looking to discover other hobbies and opportunities to be challenged.  He has been able to relax and enjoy himself without obsessing. Patient is learning self care and how to separate work and personal time. Utilized cognitive restructuring and mindfulness concepts to address worries about the future. Discussed the importance of self-care and identified activities, including attending medical appointments and engaging in socialization activities.\par \par \par  [FreeTextEntry1] : Patient will attend weekly session to learn new coping strategies to decrease anxiety and depression

## 2022-12-20 NOTE — BEHAVIORAL HEALTH
[No] : no [FreeTextEntry2] : Enhance ability to effectively cope with the full variety of life's anxieties.\par Resolve the core conflict that is the source of anxiety.\par Patient will continue to utilize strategies to manage obsessive thinking.\par Patient will refrain from impulsive/compulsive behaviors.\par Patient will utilize reviewed cognitive strategies to manage anxiety.\par \par  [Cognitive and/or Behavior Therapy] : Cognitive and/or Behavior Therapy  [[facility]] : [facility]  [Integrative Therapy] : Integrative Therapy  [Supportive Therapy] : Supportive Therapy [de-identified] : Patient was seen for a 55 minute Telehealth therapy session. Patient reported he has be caring for his mother and mother-in-law both had surgeries and he is taking them to their doctor's appointment. Patient is gaining insight into his family dynamics and how this affected her self confidence.  He is enjoying  life and feels they work as a team and he feels connected.  Patient stated he has been eating healthy an has begun an exercised regiment.  He has been able to relax and enjoy himself without obsessing. Patient is learning self care and how to separate work and personal time. Patient will begin every other biweekly sessions. Utilized cognitive restructuring and mindfulness concepts to address worries about the future. Discussed the importance of self-care and identified activities, including attending medical appointments and engaging in socialization activities.\par \par \par

## 2023-01-03 ENCOUNTER — APPOINTMENT (OUTPATIENT)
Dept: PSYCHIATRY | Facility: CLINIC | Age: 33
End: 2023-01-03
Payer: COMMERCIAL

## 2023-01-03 PROCEDURE — 90837 PSYTX W PT 60 MINUTES: CPT | Mod: 95

## 2023-01-04 NOTE — PLAN
[FreeTextEntry2] : Enhance ability to effectively cope with the full variety of life's anxieties.\par Resolve the core conflict that is the source of anxiety.\par Patient will continue to utilize strategies to manage obsessive thinking.\par Patient will refrain from impulsive/compulsive behaviors.\par Patient will utilize reviewed cognitive strategies to manage anxiety.\par  [Cognitive and/or Behavior Therapy] : Cognitive and/or Behavior Therapy  [Integrative Therapy] : Integrative Therapy  [Psychoeducation] : Psychoeducation  [Supportive Therapy] : Supportive Therapy [de-identified] : Patient was seen for a 55 minute Teledoc therapy session. Patient reported he has been proactive in self care and work/life balance. He  continues to gain insight into his thoughts and behavior and is learning how to regulate his emotions. Patient continues to work on how he reacts to issues that affect his feelings and this is helping him feel less anxious.  He has been able to relax and enjoy himself without obsessing. Patient is learning self care and how to separate work and personal time. Utilized cognitive restructuring and mindfulness concepts to address worries about the future. Discussed the importance of self-care and identified activities, including attending medical appointments and engaging in socialization activities.\par \par \par  [FreeTextEntry1] : Patient will attend weekly session to learn new coping strategies to decrease anxiety and depression

## 2023-01-04 NOTE — BEHAVIORAL HEALTH
[No] : no [FreeTextEntry2] : Enhance ability to effectively cope with the full variety of life's anxieties.\par Resolve the core conflict that is the source of anxiety.\par Patient will continue to utilize strategies to manage obsessive thinking.\par Patient will refrain from impulsive/compulsive behaviors.\par Patient will utilize reviewed cognitive strategies to manage anxiety.\par \par  [Cognitive and/or Behavior Therapy] : Cognitive and/or Behavior Therapy  [[facility]] : [facility]  [Integrative Therapy] : Integrative Therapy  [Supportive Therapy] : Supportive Therapy [de-identified] : Patient was seen for a 55 minute Telehealth therapy session. Patient reported he has be caring for his mother and mother-in-law both had surgeries and he is taking them to their doctor's appointment. Patient is gaining insight into his family dynamics and how this affected her self confidence.  He is enjoying  life and feels they work as a team and he feels connected.  Patient stated he has been eating healthy an has begun an exercised regiment.  He has been able to relax and enjoy himself without obsessing. Patient is learning self care and how to separate work and personal time. Patient will begin every other biweekly sessions. Utilized cognitive restructuring and mindfulness concepts to address worries about the future. Discussed the importance of self-care and identified activities, including attending medical appointments and engaging in socialization activities.\par \par \par

## 2023-01-24 ENCOUNTER — APPOINTMENT (OUTPATIENT)
Dept: PSYCHIATRY | Facility: CLINIC | Age: 33
End: 2023-01-24

## 2023-01-30 ENCOUNTER — APPOINTMENT (OUTPATIENT)
Dept: PSYCHIATRY | Facility: CLINIC | Age: 33
End: 2023-01-30
Payer: COMMERCIAL

## 2023-01-30 PROCEDURE — 90837 PSYTX W PT 60 MINUTES: CPT | Mod: 95

## 2023-01-31 NOTE — BEHAVIORAL HEALTH
[No] : no [FreeTextEntry2] : Enhance ability to effectively cope with the full variety of life's anxieties.\par Resolve the core conflict that is the source of anxiety.\par Patient will continue to utilize strategies to manage obsessive thinking.\par Patient will refrain from impulsive/compulsive behaviors.\par Patient will utilize reviewed cognitive strategies to manage anxiety.\par \par  [Cognitive and/or Behavior Therapy] : Cognitive and/or Behavior Therapy  [[facility]] : [facility]  [Integrative Therapy] : Integrative Therapy  [Supportive Therapy] : Supportive Therapy [de-identified] : Patient was seen for a 55 minute Telehealth therapy session. Patient reported he has be caring for his mother and mother-in-law both had surgeries and he is taking them to their doctor's appointment. Patient is gaining insight into his family dynamics and how this affected her self confidence.  He is enjoying  life and feels they work as a team and he feels connected.  Patient stated he has been eating healthy an has begun an exercised regiment.  He has been able to relax and enjoy himself without obsessing. Patient is learning self care and how to separate work and personal time. Patient will begin every other biweekly sessions. Utilized cognitive restructuring and mindfulness concepts to address worries about the future. Discussed the importance of self-care and identified activities, including attending medical appointments and engaging in socialization activities.\par \par \par

## 2023-01-31 NOTE — REASON FOR VISIT
[Continuity of care] : to ensure continuity of care [Continuing, patient not seen in-person within last 12 months (provide details below)] : Telehealth services are continuing, patient not seen in-person within last 12 months.  [Telehealth (audio & video) - Individual/Group] : This visit was provided via telehealth using real-time 2-way audio visual technology. [Other Location: e.g. Home (Enter Location, City,State)___] : The provider was located at [unfilled]. [Home] : The patient, [unfilled], was located at home, [unfilled], at the time of the visit. [Verbal consent obtained from patient/other participant(s)] : Verbal consent for telehealth/telephonic services obtained from patient/other participant(s) [FreeTextEntry4] : 1:00 [FreeTextEntry5] : 1:55 [FreeTextEntry3] :  working remotely [Patient] : Patient [FreeTextEntry1] : Anxiety/Depression

## 2023-01-31 NOTE — PLAN
[FreeTextEntry2] : Enhance ability to effectively cope with the full variety of life's anxieties.\par Resolve the core conflict that is the source of anxiety.\par Patient will continue to utilize strategies to manage obsessive thinking.\par Patient will refrain from impulsive/compulsive behaviors.\par Patient will utilize reviewed cognitive strategies to manage anxiety.\par  [Cognitive and/or Behavior Therapy] : Cognitive and/or Behavior Therapy  [Integrative Therapy] : Integrative Therapy  [Psychoeducation] : Psychoeducation  [Supportive Therapy] : Supportive Therapy [de-identified] : Patient was seen for a 55 minute Teledoc therapy session. Patient reported he returned from vacation and enjoyed himself. He continues to be proactive in self care and work/life balance. Patient is calmer and less anxious.  He  continues to gain insight into his thoughts and behavior and is learning how to regulate his emotions. Patient continues to work on how he reacts to issues that affect his feelings and this is helping him feel less anxious.  He has been able to relax and enjoy himself without obsessing. Patient is learning self care and how to separate work and personal time. Utilized cognitive restructuring and mindfulness concepts to address worries about the future. Discussed the importance of self-care and identified activities, including attending medical appointments and engaging in socialization activities.\par \par \par  [FreeTextEntry1] : Patient will attend weekly session to learn new coping strategies to decrease anxiety and depression

## 2023-02-02 ENCOUNTER — APPOINTMENT (OUTPATIENT)
Dept: PSYCHIATRY | Facility: CLINIC | Age: 33
End: 2023-02-02

## 2023-02-07 ENCOUNTER — APPOINTMENT (OUTPATIENT)
Dept: PSYCHIATRY | Facility: CLINIC | Age: 33
End: 2023-02-07
Payer: COMMERCIAL

## 2023-02-07 PROCEDURE — 99214 OFFICE O/P EST MOD 30 MIN: CPT | Mod: 95

## 2023-02-07 NOTE — DISCUSSION/SUMMARY
[FreeTextEntry1] : The patient is a 32 yo man with hx of PANDAS syndrome diagnosed at age 4, with hx of sx consistent with OCD, primarily obsessions and generalized anxiety disorder, presents with increasing anxiety for past 6 months \par \par 10/27/2021: Patient states with the help of medication and therapy he is able to manage his anxiety, intrusive thoughts and compulsions better and wants to stay at the current dose and continue to monitor for further improvement of the symptoms.\par 11/29/2021: Patient states there is reduction in general anxiety and OCD sx since starting Prozac, but no incremental improvement since last visit, will increase dose for increased therapeutic benefit. \par 1/10/2022: Patient reports reduction in irritability and also not double checking things, still periodically has increase in anxiety feels overall with the current dose of Prozac he is able to cope with general anxiety well and using his coping skills effectively.\par 3/28/2022: Patient reports with therapy and medications he feels that his OCD and general anxiety symptoms are much better controlled on current Prozac dose and with weekly therapy  \par 6/20/2022: Patient reports that he is doing well, reports sustained improvement of general anxiety and OCD symptoms, and feels both the therapy and medications are helping.  He was concerned that maybe the recent forgetfulness/misplacing things may be is a side effect from medication, reviewed medication side effects and the current symptoms that he is experiencing less likely related to medications or in general any anxiety symptoms, he will continue to monitor.\par 10/18/2022: The patient reported that he is continuing to do very well, her with sustained improvement of general anxiety and OCD symptoms.\par 2/07/2023: Patient is continuing to do well with good control of symptoms of general anxiety and OCD with current medications and continuation of individual psychotherapy.\par

## 2023-02-07 NOTE — HISTORY OF PRESENT ILLNESS
[Home] : at home, [unfilled] , at the time of the visit. [Medical Office: ([facility])___] : at the medical office located in  [Verbal consent obtained from patient] : the patient, [unfilled] [FreeTextEntry1] : This AV Visit was conducted using L2C platform\par \par Today, the patient reported that he is doing well since last visit.  He states "everything is good and status quo".  Patient reported that he he no longer feels tensed, on edge and also denied excessive worrying.  He reported that he also has received a lot of positive feedback from friends and family which he is happy about.  Patient reported that the OCD symptoms are also in good control.  Patient reported that sleep and appetite are good.  \par He states that the medication regimen he is currently on, fluoxetine 30 mg daily and the individual therapy sessions are keeping him stable.  \par He states he started meds for HLD, otherwise no new medical issues, no new medication since last visit\par He denied excess ETOH use/abuse. \par He denied cannabis and other substance use/abuse\par

## 2023-02-13 ENCOUNTER — APPOINTMENT (OUTPATIENT)
Dept: PSYCHIATRY | Facility: CLINIC | Age: 33
End: 2023-02-13
Payer: COMMERCIAL

## 2023-02-13 PROCEDURE — 90837 PSYTX W PT 60 MINUTES: CPT | Mod: 95

## 2023-02-14 NOTE — BEHAVIORAL HEALTH
[No] : no [FreeTextEntry2] : Enhance ability to effectively cope with the full variety of life's anxieties.\par Resolve the core conflict that is the source of anxiety.\par Patient will continue to utilize strategies to manage obsessive thinking.\par Patient will refrain from impulsive/compulsive behaviors.\par Patient will utilize reviewed cognitive strategies to manage anxiety.\par \par  [Cognitive and/or Behavior Therapy] : Cognitive and/or Behavior Therapy  [[facility]] : [facility]  [Integrative Therapy] : Integrative Therapy  [Supportive Therapy] : Supportive Therapy [de-identified] : Patient was seen for a 55 minute Telehealth therapy session. Patient reported he has be caring for his mother and mother-in-law both had surgeries and he is taking them to their doctor's appointment. Patient is gaining insight into his family dynamics and how this affected her self confidence.  He is enjoying  life and feels they work as a team and he feels connected.  Patient stated he has been eating healthy an has begun an exercised regiment.  He has been able to relax and enjoy himself without obsessing. Patient is learning self care and how to separate work and personal time. Patient will begin every other biweekly sessions. Utilized cognitive restructuring and mindfulness concepts to address worries about the future. Discussed the importance of self-care and identified activities, including attending medical appointments and engaging in socialization activities.\par \par \par

## 2023-02-14 NOTE — PLAN
[FreeTextEntry2] : Enhance ability to effectively cope with the full variety of life's anxieties.\par Resolve the core conflict that is the source of anxiety.\par Patient will continue to utilize strategies to manage obsessive thinking.\par Patient will refrain from impulsive/compulsive behaviors.\par Patient will utilize reviewed cognitive strategies to manage anxiety.\par  [Cognitive and/or Behavior Therapy] : Cognitive and/or Behavior Therapy  [Integrative Therapy] : Integrative Therapy  [Psychoeducation] : Psychoeducation  [Supportive Therapy] : Supportive Therapy [de-identified] : Patient was seen for a 55 minute Teledoc therapy session. Patient reported he is doing well and feels he has been proactive in self care and work/life balance. Patient is calmer and less anxious. Patient is understanding his family dynamics with his parents and is learning how to set boundaries.   He  continues to gain insight into his thoughts and behavior and is learning how to regulate his emotions. Patient continues to work on how he reacts to issues that affect his feelings and this is helping him feel less anxious.  He has been able to relax and enjoy himself without obsessing. Patient is learning self care and how to separate work and personal time. Utilized cognitive restructuring and mindfulness concepts to address worries about the future. Discussed the importance of self-care and identified activities, including attending medical appointments and engaging in socialization activities.\par \par \par  [FreeTextEntry1] : Patient will attend weekly session to learn new coping strategies to decrease anxiety and depression

## 2023-02-14 NOTE — REASON FOR VISIT
[Continuity of care] : to ensure continuity of care [Continuing, patient not seen in-person within last 12 months (provide details below)] : Telehealth services are continuing, patient not seen in-person within last 12 months.  [Telehealth (audio & video) - Individual/Group] : This visit was provided via telehealth using real-time 2-way audio visual technology. [Other Location: e.g. Home (Enter Location, City,State)___] : The provider was located at [unfilled]. [Home] : The patient, [unfilled], was located at home, [unfilled], at the time of the visit. [FreeTextEntry4] : 1:00 [FreeTextEntry5] : 1:55 [FreeTextEntry3] :  working remotely [Patient] : Patient [FreeTextEntry1] : Anxiety/Depression

## 2023-02-27 ENCOUNTER — APPOINTMENT (OUTPATIENT)
Dept: PSYCHIATRY | Facility: CLINIC | Age: 33
End: 2023-02-27
Payer: COMMERCIAL

## 2023-02-27 PROCEDURE — 90837 PSYTX W PT 60 MINUTES: CPT | Mod: 95

## 2023-02-27 NOTE — PLAN
[FreeTextEntry2] : Enhance ability to effectively cope with the full variety of life's anxieties.\par Resolve the core conflict that is the source of anxiety.\par Patient will continue to utilize strategies to manage obsessive thinking.\par Patient will refrain from impulsive/compulsive behaviors.\par Patient will utilize reviewed cognitive strategies to manage anxiety.\par  [Cognitive and/or Behavior Therapy] : Cognitive and/or Behavior Therapy  [Integrative Therapy] : Integrative Therapy  [Psychoeducation] : Psychoeducation  [Supportive Therapy] : Supportive Therapy [de-identified] : Patient was seen for a 55 minute Teledoc therapy session. Patient reported he has been feeling some stressors at work but states he has a good support system there. Patient states he has gained weight and in session worked on ways he can eat healthier and prepare before going to work. Patient continues to work on how he reacts to issues that affect his feelings and this is helping him feel less anxious.  He has been able to relax and enjoy himself without obsessing. Patient is learning self care and how to separate work and personal time. Utilized cognitive restructuring and mindfulness concepts to address worries about the future. Discussed the importance of self-care and identified activities, including attending medical appointments and engaging in socialization activities.\par \par \par  [FreeTextEntry1] : Patient will attend weekly session to learn new coping strategies to decrease anxiety and depression

## 2023-02-27 NOTE — BEHAVIORAL HEALTH
[No] : no [FreeTextEntry2] : Enhance ability to effectively cope with the full variety of life's anxieties.\par Resolve the core conflict that is the source of anxiety.\par Patient will continue to utilize strategies to manage obsessive thinking.\par Patient will refrain from impulsive/compulsive behaviors.\par Patient will utilize reviewed cognitive strategies to manage anxiety.\par \par  [Cognitive and/or Behavior Therapy] : Cognitive and/or Behavior Therapy  [[facility]] : [facility]  [Integrative Therapy] : Integrative Therapy  [Supportive Therapy] : Supportive Therapy [de-identified] : Patient was seen for a 55 minute Telehealth therapy session. Patient reported he has be caring for his mother and mother-in-law both had surgeries and he is taking them to their doctor's appointment. Patient is gaining insight into his family dynamics and how this affected her self confidence.  He is enjoying  life and feels they work as a team and he feels connected.  Patient stated he has been eating healthy an has begun an exercised regiment.  He has been able to relax and enjoy himself without obsessing. Patient is learning self care and how to separate work and personal time. Patient will begin every other biweekly sessions. Utilized cognitive restructuring and mindfulness concepts to address worries about the future. Discussed the importance of self-care and identified activities, including attending medical appointments and engaging in socialization activities.\par \par \par

## 2023-03-13 ENCOUNTER — APPOINTMENT (OUTPATIENT)
Dept: PSYCHIATRY | Facility: CLINIC | Age: 33
End: 2023-03-13
Payer: COMMERCIAL

## 2023-03-13 PROCEDURE — 90837 PSYTX W PT 60 MINUTES: CPT | Mod: 95

## 2023-03-13 NOTE — PLAN
[FreeTextEntry2] : Enhance ability to effectively cope with the full variety of life's anxieties.\par Resolve the core conflict that is the source of anxiety.\par Patient will continue to utilize strategies to manage obsessive thinking.\par Patient will refrain from impulsive/compulsive behaviors.\par Patient will utilize reviewed cognitive strategies to manage anxiety.\par  [Cognitive and/or Behavior Therapy] : Cognitive and/or Behavior Therapy  [Integrative Therapy] : Integrative Therapy  [Psychoeducation] : Psychoeducation  [Supportive Therapy] : Supportive Therapy [de-identified] : Patient was seen for a 55 minute Teledoc therapy session. Patient reported he is doing well, he continues to work on decreasing his stress.  He has been able to relax and enjoy himself without obsessing. Patient is communicating his feelings more to his wife and feels more connected. Patient is exploring different opportunities as hobbies. Patient is learning self care and how to separate work and personal time. Utilized cognitive restructuring and mindfulness concepts to address worries about the future. Discussed the importance of self-care and identified activities, including attending medical appointments and engaging in socialization activities.\par \par \par  [FreeTextEntry1] : Patient will attend weekly session to learn new coping strategies to decrease anxiety and depression

## 2023-03-13 NOTE — BEHAVIORAL HEALTH
[No] : no [FreeTextEntry2] : Enhance ability to effectively cope with the full variety of life's anxieties.\par Resolve the core conflict that is the source of anxiety.\par Patient will continue to utilize strategies to manage obsessive thinking.\par Patient will refrain from impulsive/compulsive behaviors.\par Patient will utilize reviewed cognitive strategies to manage anxiety.\par \par  [Cognitive and/or Behavior Therapy] : Cognitive and/or Behavior Therapy  [[place]] : [facility]  [Integrative Therapy] : Integrative Therapy  [Supportive Therapy] : Supportive Therapy [de-identified] : Patient was seen for a 55 minute Telehealth therapy session. Patient reported he has be caring for his mother and mother-in-law both had surgeries and he is taking them to their doctor's appointment. Patient is gaining insight into his family dynamics and how this affected her self confidence.  He is enjoying  life and feels they work as a team and he feels connected.  Patient stated he has been eating healthy an has begun an exercised regiment.  He has been able to relax and enjoy himself without obsessing. Patient is learning self care and how to separate work and personal time. Patient will begin every other biweekly sessions. Utilized cognitive restructuring and mindfulness concepts to address worries about the future. Discussed the importance of self-care and identified activities, including attending medical appointments and engaging in socialization activities.\par \par \par

## 2023-03-27 ENCOUNTER — APPOINTMENT (OUTPATIENT)
Dept: PSYCHIATRY | Facility: CLINIC | Age: 33
End: 2023-03-27

## 2023-04-04 ENCOUNTER — APPOINTMENT (OUTPATIENT)
Dept: DERMATOLOGY | Facility: CLINIC | Age: 33
End: 2023-04-04
Payer: COMMERCIAL

## 2023-04-04 DIAGNOSIS — L70.9 ACNE, UNSPECIFIED: ICD-10-CM

## 2023-04-04 DIAGNOSIS — D22.9 MELANOCYTIC NEVI, UNSPECIFIED: ICD-10-CM

## 2023-04-04 PROCEDURE — 99214 OFFICE O/P EST MOD 30 MIN: CPT

## 2023-04-04 NOTE — PHYSICAL EXAM
[FreeTextEntry3] : pustules on buttocks\par brown macules and papules, symmetric with no concerning dermoscopic features on back \par

## 2023-04-04 NOTE — HISTORY OF PRESENT ILLNESS
[FreeTextEntry1] : evaluation of skin moles  [de-identified] : here for TBSE\par Personal hx: no history of skin cancer \par Family Hx: mother with melanoma\par \par no new or changing skin lesions, no itching/bleeding/painful skin lesions\par \par Gets intermittent pimples on buttocks - was previously given bp wash \par \par

## 2023-04-04 NOTE — ASSESSMENT
[FreeTextEntry1] : Benign appearing nevi\par - benign, reassurance, no intervention needed unless irritated\par \par - TBSE performed today - no concerning findings \par - TBSE every year with dermatologist\par - Recommend regular dental evaluations  \par - Photoprotection reviewed including sun-protective behaviors, protective clothing, and the use of OTC broad-spectrum SPF 30+ sunscreens was advised\par - RTC if develops lesions that are new, symptomatic (bleeding/itching), changing in size/color/shape\par \par Folliculitis, chronic, mild flaring\par counseled on benign nature, but may require intermittent treatment due to chronic nature\par  bp 5% wash 3x/week in shower. Counseled on side effects including dryness, staining of towels\par rtc if not improving, otherwise once/year for TBSE \par

## 2023-04-10 ENCOUNTER — APPOINTMENT (OUTPATIENT)
Dept: PSYCHIATRY | Facility: CLINIC | Age: 33
End: 2023-04-10
Payer: COMMERCIAL

## 2023-04-10 PROCEDURE — 90837 PSYTX W PT 60 MINUTES: CPT | Mod: 95

## 2023-04-12 NOTE — PLAN
[FreeTextEntry2] : Enhance ability to effectively cope with the full variety of life's anxieties.\par Resolve the core conflict that is the source of anxiety.\par Patient will continue to utilize strategies to manage obsessive thinking.\par Patient will refrain from impulsive/compulsive behaviors.\par Patient will utilize reviewed cognitive strategies to manage anxiety.\par  [Cognitive and/or Behavior Therapy] : Cognitive and/or Behavior Therapy  [Integrative Therapy] : Integrative Therapy  [Psychoeducation] : Psychoeducation  [Supportive Therapy] : Supportive Therapy [de-identified] : Patient was seen for a 55 minute [facility] therapy session. Patient reported he has been feeling an increase in anxiety surrounding family issues. In session worked on coping skills to help him decrease his stress level. Patient is communicating his feelings more to his wife and feels more connected. Patient is exploring different opportunities as hobbies. Patient is learning self care and how to separate work and personal time. Utilized cognitive restructuring and mindfulness concepts to address worries about the future. Discussed the importance of self-care and identified activities, including attending medical appointments and engaging in socialization activities.\par \par \par  [FreeTextEntry1] : Patient will attend weekly session to learn new coping strategies to decrease anxiety and depression

## 2023-04-12 NOTE — BEHAVIORAL HEALTH
[No] : no [FreeTextEntry2] : Enhance ability to effectively cope with the full variety of life's anxieties.\par Resolve the core conflict that is the source of anxiety.\par Patient will continue to utilize strategies to manage obsessive thinking.\par Patient will refrain from impulsive/compulsive behaviors.\par Patient will utilize reviewed cognitive strategies to manage anxiety.\par \par  [Cognitive and/or Behavior Therapy] : Cognitive and/or Behavior Therapy  [[place] [facility]] : [place] [facility]  [Integrative Therapy] : Integrative Therapy  [Supportive Therapy] : Supportive Therapy [de-identified] : Patient was seen for a 55 minute Telehealth therapy session. Patient reported he has be caring for his mother and mother-in-law both had surgeries and he is taking them to their doctor's appointment. Patient is gaining insight into his family dynamics and how this affected her self confidence.  He is enjoying  life and feels they work as a team and he feels connected.  Patient stated he has been eating healthy an has begun an exercised regiment.  He has been able to relax and enjoy himself without obsessing. Patient is learning self care and how to separate work and personal time. Patient will begin every other biweekly sessions. Utilized cognitive restructuring and mindfulness concepts to address worries about the future. Discussed the importance of self-care and identified activities, including attending medical appointments and engaging in socialization activities.\par \par \par

## 2023-04-24 ENCOUNTER — APPOINTMENT (OUTPATIENT)
Dept: PSYCHIATRY | Facility: CLINIC | Age: 33
End: 2023-04-24

## 2023-04-25 ENCOUNTER — APPOINTMENT (OUTPATIENT)
Dept: ENDOCRINOLOGY | Facility: CLINIC | Age: 33
End: 2023-04-25
Payer: COMMERCIAL

## 2023-04-25 VITALS
SYSTOLIC BLOOD PRESSURE: 130 MMHG | HEART RATE: 105 BPM | DIASTOLIC BLOOD PRESSURE: 90 MMHG | BODY MASS INDEX: 39.55 KG/M2 | HEIGHT: 69 IN | WEIGHT: 267 LBS | OXYGEN SATURATION: 96 %

## 2023-04-25 LAB — HBA1C MFR BLD HPLC: 6.8

## 2023-04-25 PROCEDURE — 95251 CONT GLUC MNTR ANALYSIS I&R: CPT

## 2023-04-25 PROCEDURE — 83036 HEMOGLOBIN GLYCOSYLATED A1C: CPT | Mod: QW

## 2023-04-25 PROCEDURE — 99214 OFFICE O/P EST MOD 30 MIN: CPT | Mod: 25

## 2023-04-25 NOTE — REVIEW OF SYSTEMS
[Depression] : depression [Anxiety] : anxiety [Negative] : Heme/Lymph [All other systems negative] : All other systems negative [Fatigue] : no fatigue [Decreased Appetite] : appetite not decreased [Recent Weight Gain (___ Lbs)] : no recent weight gain [Recent Weight Loss (___ Lbs)] : no recent weight loss [Visual Field Defect] : no visual field defect [Dry Eyes] : no dryness [Dysphagia] : no dysphagia [Neck Pain] : no neck pain [Dysphonia] : no dysphonia [Nasal Congestion] : no nasal congestion [Chest Pain] : no chest pain [Slow Heart Rate] : heart rate is not slow [Palpitations] : no palpitations [Fast Heart Rate] : heart rate is not fast [Shortness Of Breath] : no shortness of breath [Cough] : no cough [Nausea] : no nausea [Constipation] : no constipation [Vomiting] : no vomiting [Diarrhea] : no diarrhea [Polyuria] : no polyuria [Hesistancy] : no hesitancy [Joint Pain] : no joint pain [Headaches] : no headaches [Dizziness] : no dizziness [Tremors] : no tremors [Pain/Numbness of Digits] : no pain/numbness of digits [Polydipsia] : no polydipsia [Cold Intolerance] : no cold intolerance [Easy Bleeding] : no ~M tendency for easy bleeding [Easy Bruising] : no tendency for easy bruising

## 2023-04-25 NOTE — PHYSICAL EXAM
[Alert] : alert [Well Nourished] : well nourished [Healthy Appearance] : healthy appearance [No Acute Distress] : no acute distress [Well Developed] : well developed [Normal Sclera/Conjunctiva] : normal sclera/conjunctiva [EOMI] : extra ocular movement intact [PERRL] : pupils equal, round and reactive to light [Normal Outer Ear/Nose] : the ears and nose were normal in appearance [Normal Hearing] : hearing was normal [Normal TMs] : both tympanic membranes were normal [Thyroid Not Enlarged] : the thyroid was not enlarged [No Thyroid Nodules] : no palpable thyroid nodules [No Respiratory Distress] : no respiratory distress [No Accessory Muscle Use] : no accessory muscle use [Normal Rate and Effort] : normal respiratory rate and effort [Clear to Auscultation] : lungs were clear to auscultation bilaterally [Normal S1, S2] : normal S1 and S2 [Normal Rate] : heart rate was normal [Regular Rhythm] : with a regular rhythm [No Edema] : no peripheral edema [Normal Bowel Sounds] : normal bowel sounds [Not Tender] : non-tender [Soft] : abdomen soft [Normal Gait] : normal gait [No Clubbing, Cyanosis] : no clubbing  or cyanosis of the fingernails [No Involuntary Movements] : no involuntary movements were seen [No Joint Swelling] : no joint swelling seen [Normal Strength/Tone] : muscle strength and tone were normal [No Rash] : no rash [No Skin Lesions] : no skin lesions [Right foot was examined, including] : right foot ~C was examined, including visual inspection with sensory and pulse exams [Left foot was examined, including] : left foot ~C was examined, including visual inspection with sensory and pulse exams [Normal] : normal [Full ROM] : with full range of motion [No Motor Deficits] : the motor exam was normal [No Tremors] : no tremors [Oriented x3] : oriented to person, place, and time [Normal Affect] : the affect was normal [Normal Insight/Judgement] : insight and judgment were intact [Normal Mood] : the mood was normal [Diminished Throughout Both Feet] : normal tactile sensation with monofilament testing throughout both feet [de-identified] : Left lower quadrant Tandem, Dexcom on LUE

## 2023-04-25 NOTE — ASSESSMENT
[Carbohydrate Consistent Diet] : carbohydrate consistent diet [Importance of Diet and Exercise] : importance of diet and exercise to improve glycemic control, achieve weight loss and improve cardiovascular health [Hypoglycemia Management] : hypoglycemia management [Glucagon Use] : glucagon use [Action and use of Insulin] : action and use of short and long-acting insulin [Self Monitoring of Blood Glucose] : self monitoring of blood glucose [Retinopathy Screening] : Patient was referred to ophthalmology for retinopathy screening [FreeTextEntry1] : 33 y/o male with well controlled T1DM (A1c 6.8%) without complications. Also with mild HLD () and vitamin D deficiency. \par \par T1DM, controlled\par - I had a discussion regarding healthy diet (consistent carbohydrates) with the patient. I also emphasized to maintain routine exercise activity as tolerated.\par - tandem down, unable to view download. Dexcom showing post dinner hyperglycemia. Pt reports over-correcting leading to lows overnight\par - made ICR more aggressive in evening and ISF less aggressive as per HPI\par - We discussed that mild hypoglycemia (BG<70mg/dl) should be corrected with 4oz juice or 3 glucose tablets and then recheck glucose in 15 minutes\par - Uptodate with ophtho\par \par Vitamin D deficiency\par - Continue Vitamin D3 2000 units QD or 10,000 units once/week\par - Levels normal in Nov 2021\par \par Mild HLD\par -  and his ASCVD risk is low\par - We discussed that ADA guidelines suggest statin for all DM patient regardless of LDL given increased risk of MI\par - started atorvastatin 20 mg in 11/2022\par \par pt has glucagon, ketone strips, and backup insulin pens\par UTD flu and covid booster 2022\par \par RTC 6 months\par pt will send pump download and will pair his pump to [facility]\par \par \par

## 2023-04-25 NOTE — HISTORY OF PRESENT ILLNESS
[FreeTextEntry1] : 31 y/o male here for f/u management of T1DM (A1c 6.8%).  Here for follow up.\par \par DM was diagnosed at age 14. No h/o DKA in the past. \par Complications include: Last ophtho Jan 2022, no retinopathy, + h/o microalbuminuria and resolved. No neuropathy. No MI or CVA\par Has ophtho appt in 6/2023.\par \par 11/1/22 - reports higher sugars in 300s  before bed, has not changed any diet or anything, may be bolusing later. Works evening 3-11 or 4-12 schedule. Will eat around 7 pm, no hypoglycemia\par No new meds \par \par 4/25/23 visit: has gained weight, now 267 lbs. Not as active. Still working night.  Just got a peleton. Will be starting to exercise. \par Notices after dinner hyperglycemia after 7 pm. \par \par Current DM Medications/Insulin: Changes site usually every 2.5-3 days and changes Dexcom every 10 days\par Medtronic Tandem T slim with control IQ, started in Aug 2021 and Dexcom G6 Medtronic but did not use guardian sensor so never in auto mode.\par Using Humalog insulin, working well. Boluses 10-15 minutes before eating.\par Does not use exercise mode, uses sleep mode\par \par Settings:\par 12AM 1\par 8AM 1.4\par 5PM 1.45\par 10PM 1.4\par total 35.1 units\par \par ISF\par 12AM 20--> 24\par 8am 20\par 5pm 24\par \par ICR\par 12AM 6.6\par 8AM 6.7\par 5PM 6-->5\par 10PM 6-->5\par \par \par \par Not exercising now, will be trying to now \par \par Dexcom downloaded today:\par 4/12-4/25/23\par 7% very high\par 25% high\par 64% in range\par 4% low\par 0% very low \par GMI 7% \par SD 54\par pattern: overnight lows  after correcting post dinner elevations\par \par Going to gym 2x/week 10am-11am (activity mode BGT change to 150) \par Now working 8 hour shifts, 4-12am, evening shifts\par Admits to eating only 2 meals 1pm and 7pm. Will snack after dinner until bedtime 12-1am. Admits to bolusing for snacks too\par Current Diet Includes: mostly eating 2 meals (usually lunch and dinner). States always confident in carb counting\par 60-90 grams for each meal\par Drinks: no soda. Drinks juice if low.\par Works 3-11pm or 4pm-12am at [employer] ([place]par Sleeps late, awake by 10am\par \par Back-Up:\par Had used pens in the past, has Lantus and Novolog, has it at home. Has glucagon\par Has ketone strips\par \par UTD Flu shot 2022, UTD covid booster 2022\par \par Additional history:\par PMH: Obesity, Vitamin D Deficiency, Mild HLD\par Meds: Vitamin D3 2000 units QD, Prozac 20 mg QD\par FH: Dad and GM with T2DM. Maternal GF and uncle with T2DM \par \par Works at [employer]

## 2023-04-26 ENCOUNTER — APPOINTMENT (OUTPATIENT)
Dept: PSYCHIATRY | Facility: CLINIC | Age: 33
End: 2023-04-26

## 2023-04-27 ENCOUNTER — NON-APPOINTMENT (OUTPATIENT)
Age: 33
End: 2023-04-27

## 2023-04-27 ENCOUNTER — TRANSCRIPTION ENCOUNTER (OUTPATIENT)
Age: 33
End: 2023-04-27

## 2023-04-27 LAB
ALBUMIN SERPL ELPH-MCNC: 4.3 G/DL
ALP BLD-CCNC: 76 U/L
ALT SERPL-CCNC: 30 U/L
ANION GAP SERPL CALC-SCNC: 10 MMOL/L
AST SERPL-CCNC: 22 U/L
BILIRUB SERPL-MCNC: 0.6 MG/DL
BUN SERPL-MCNC: 14 MG/DL
CALCIUM SERPL-MCNC: 9.4 MG/DL
CHLORIDE SERPL-SCNC: 101 MMOL/L
CHOLEST SERPL-MCNC: 142 MG/DL
CO2 SERPL-SCNC: 28 MMOL/L
CREAT SERPL-MCNC: 0.86 MG/DL
CREAT SPEC-SCNC: 255 MG/DL
EGFR: 117 ML/MIN/1.73M2
GLUCOSE SERPL-MCNC: 177 MG/DL
HDLC SERPL-MCNC: 52 MG/DL
LDLC SERPL CALC-MCNC: 76 MG/DL
MICROALBUMIN 24H UR DL<=1MG/L-MCNC: 1.2 MG/DL
MICROALBUMIN/CREAT 24H UR-RTO: 5 MG/G
NONHDLC SERPL-MCNC: 90 MG/DL
POTASSIUM SERPL-SCNC: 4.7 MMOL/L
PROT SERPL-MCNC: 6.8 G/DL
SODIUM SERPL-SCNC: 139 MMOL/L
TRIGL SERPL-MCNC: 68 MG/DL
TSH SERPL-ACNC: 2.27 UIU/ML

## 2023-05-08 ENCOUNTER — APPOINTMENT (OUTPATIENT)
Dept: PSYCHIATRY | Facility: CLINIC | Age: 33
End: 2023-05-08

## 2023-05-09 ENCOUNTER — APPOINTMENT (OUTPATIENT)
Dept: PSYCHIATRY | Facility: CLINIC | Age: 33
End: 2023-05-09

## 2023-06-03 ENCOUNTER — NON-APPOINTMENT (OUTPATIENT)
Age: 33
End: 2023-06-03

## 2023-06-05 ENCOUNTER — APPOINTMENT (OUTPATIENT)
Dept: PSYCHIATRY | Facility: CLINIC | Age: 33
End: 2023-06-05
Payer: COMMERCIAL

## 2023-06-05 PROCEDURE — 90837 PSYTX W PT 60 MINUTES: CPT | Mod: 95

## 2023-06-06 NOTE — PLAN
[FreeTextEntry2] : Enhance ability to effectively cope with the full variety of life's anxieties.\par Resolve the core conflict that is the source of anxiety.\par Patient will continue to utilize strategies to manage obsessive thinking.\par Patient will refrain from impulsive/compulsive behaviors.\par Patient will utilize reviewed cognitive strategies to manage anxiety.\par  [Cognitive and/or Behavior Therapy] : Cognitive and/or Behavior Therapy  [Integrative Therapy] : Integrative Therapy  [Psychoeducation] : Psychoeducation  [Supportive Therapy] : Supportive Therapy [de-identified] : Patient was seen for a 55 minute [facility] therapy session. Patient reported he is celebrating his one year anniversary and all is doing well. He will be going on a cruise. Patient reported he has gained weight and feels unhealthy. In session explored different ways he can eat healthy and exercise.   Patient is exploring different opportunities as hobbies. Patient is learning self care and how to separate work and personal time. Utilized cognitive restructuring and mindfulness concepts to address worries about the future. Discussed the importance of self-care and identified activities, including attending medical appointments and engaging in socialization activities.\par \par \par  [FreeTextEntry1] : Patient will attend weekly session to learn new coping strategies to decrease anxiety and depression

## 2023-06-06 NOTE — BEHAVIORAL HEALTH
[No] : no [FreeTextEntry2] : Enhance ability to effectively cope with the full variety of life's anxieties.\par Resolve the core conflict that is the source of anxiety.\par Patient will continue to utilize strategies to manage obsessive thinking.\par Patient will refrain from impulsive/compulsive behaviors.\par Patient will utilize reviewed cognitive strategies to manage anxiety.\par \par  [Cognitive and/or Behavior Therapy] : Cognitive and/or Behavior Therapy  [[place] [facility]] : [facility]  [Integrative Therapy] : Integrative Therapy  [Supportive Therapy] : Supportive Therapy [de-identified] : Patient was seen for a 55 minute Telehealth therapy session. Patient reported he has be caring for his mother and mother-in-law both had surgeries and he is taking them to their doctor's appointment. Patient is gaining insight into his family dynamics and how this affected her self confidence.  He is enjoying  life and feels they work as a team and he feels connected.  Patient stated he has been eating healthy an has begun an exercised regiment.  He has been able to relax and enjoy himself without obsessing. Patient is learning self care and how to separate work and personal time. Patient will begin every other biweekly sessions. Utilized cognitive restructuring and mindfulness concepts to address worries about the future. Discussed the importance of self-care and identified activities, including attending medical appointments and engaging in socialization activities.\par \par \par

## 2023-06-19 ENCOUNTER — APPOINTMENT (OUTPATIENT)
Dept: PSYCHIATRY | Facility: CLINIC | Age: 33
End: 2023-06-19

## 2023-06-21 ENCOUNTER — APPOINTMENT (OUTPATIENT)
Dept: PSYCHIATRY | Facility: CLINIC | Age: 33
End: 2023-06-21
Payer: COMMERCIAL

## 2023-06-21 PROCEDURE — 99214 OFFICE O/P EST MOD 30 MIN: CPT | Mod: 95

## 2023-06-21 NOTE — PLAN
[FreeTextEntry5] : Psychoeducation and supportive therapy provided,  and discussed rationale for recommended meds, continue to monitor for continued symptom stability \par Medication:fluoxetine 30 mg/day\par Educated patient of importance of remaining abstinent from drugs and alcohol. \par Emergency procedures were discussed: pt. educated to call 911 or go to nearest ER for worsening of symptoms/suicidal/homicidal ideation. \par Continue for individual psychotherapy to learn coping skills \par RTC in 3 months or earlier as needed \par Patient given opportunity to ask questions and their questions were answered and they expressed understanding and agreement with above plan.\par

## 2023-06-21 NOTE — HISTORY OF PRESENT ILLNESS
[FreeTextEntry1] : This AV Visit was conducted using [employer] platform\par \par Today, the patient states he is continuing to do well since last visit.  Patient apologized for missing his last appointment because he overslept after working the night before the appointment date.  Patient states that he is continuing to do well and feels that between the "medication and therapy" his symptoms have been under good control and that he no longer feels tense, on edge and denied excessive worrying.  Patient reported OCD symptoms continue to remain in good control as well.  Patient states that he continues to receive positive feedback from friends and family and his wife that they see a difference in how he is handling things and how his mood and behavior are after he started taking the medication and he is happy about the feedback.  \par Patient reported that sleep and appetite are good.  \par No new medical issues, no new medication since last visit\par He denied excess ETOH use/abuse. \par He denied cannabis and other substance use/abuse\par

## 2023-06-21 NOTE — REASON FOR VISIT
[Patient preference] : as per patient preference [Continuity of care] : to ensure continuity of care [Medical Office: ([facility])___] : The provider was located at the medical office in [unfilled]. [Home] : The patient, [unfilled], was located at home, [unfilled], at the time of the visit. [Verbal consent obtained from patient/other participant(s)] : Verbal consent for telehealth/telephonic services obtained from patient/other participant(s) [FreeTextEntry2] : 11/29/2021

## 2023-06-21 NOTE — DISCUSSION/SUMMARY
[FreeTextEntry1] : The patient is a 32 yo man with hx of PANDAS syndrome diagnosed at age 4, with hx of sx consistent with OCD, primarily obsessions and generalized anxiety disorder, presents with increasing anxiety for past 6 months \par \par 10/27/2021: Patient states with the help of medication and therapy he is able to manage his anxiety, intrusive thoughts and compulsions better and wants to stay at the current dose and continue to monitor for further improvement of the symptoms.\par 11/29/2021: Patient states there is reduction in general anxiety and OCD sx since starting Prozac, but no incremental improvement since last visit, will increase dose for increased therapeutic benefit. \par 1/10/2022: Patient reports reduction in irritability and also not double checking things, still periodically has increase in anxiety feels overall with the current dose of Prozac he is able to cope with general anxiety well and using his coping skills effectively.\par 3/28/2022: Patient reports with therapy and medications he feels that his OCD and general anxiety symptoms are much better controlled on current Prozac dose and with weekly therapy  \par 6/20/2022: Patient reports that he is doing well, reports sustained improvement of general anxiety and OCD symptoms, and feels both the therapy and medications are helping.  He was concerned that maybe the recent forgetfulness/misplacing things may be is a side effect from medication, reviewed medication side effects and the current symptoms that he is experiencing less likely related to medications or in general any anxiety symptoms, he will continue to monitor.\par 10/18/2022: The patient reported that he is continuing to do very well, her with sustained improvement of general anxiety and OCD symptoms.\par 2/07/2023: Patient is continuing to do well with good control of symptoms of general anxiety and OCD with current medications and continuation of individual psychotherapy.\par 6/21/2023: The patient continues to remain stable on current medications with good control of symptoms of depression and anxiety and feels that he is also coping well and managing his symptoms well with ongoing therapy.

## 2023-06-27 ENCOUNTER — APPOINTMENT (OUTPATIENT)
Dept: PSYCHIATRY | Facility: CLINIC | Age: 33
End: 2023-06-27
Payer: COMMERCIAL

## 2023-06-27 PROCEDURE — 90837 PSYTX W PT 60 MINUTES: CPT | Mod: 95

## 2023-06-29 NOTE — PLAN
[FreeTextEntry2] : Enhance ability to effectively cope with the full variety of life's anxieties.\par Resolve the core conflict that is the source of anxiety.\par Patient will continue to utilize strategies to manage obsessive thinking.\par Patient will refrain from impulsive/compulsive behaviors.\par Patient will utilize reviewed cognitive strategies to manage anxiety.\par  [Cognitive and/or Behavior Therapy] : Cognitive and/or Behavior Therapy  [Integrative Therapy] : Integrative Therapy  [Psychoeducation] : Psychoeducation  [Supportive Therapy] : Supportive Therapy [de-identified] : Patient was seen for a 55 minute [facility]oc therapy session. Patient reported he is celebrated  his one year anniversary and went on a cruise.  Patient reported he has gained weight and feels unhealthy. In session explored different ways he can eat healthy and exercise. Patient stated he has learned how to set better boundaries with family and feels less stress.  Patient is exploring different opportunities as hobbies. Patient is learning self care and how to separate work and personal time. Utilized cognitive restructuring and mindfulness concepts to address worries about the future. Discussed the importance of self-care and identified activities, including attending medical appointments and engaging in socialization activities.\par \par \par  [FreeTextEntry1] : Patient will attend weekly session to learn new coping strategies to decrease anxiety and depression

## 2023-06-29 NOTE — BEHAVIORAL HEALTH
[No] : no [FreeTextEntry2] : Enhance ability to effectively cope with the full variety of life's anxieties.\par Resolve the core conflict that is the source of anxiety.\par Patient will continue to utilize strategies to manage obsessive thinking.\par Patient will refrain from impulsive/compulsive behaviors.\par Patient will utilize reviewed cognitive strategies to manage anxiety.\par \par  [Cognitive and/or Behavior Therapy] : Cognitive and/or Behavior Therapy  [[place] [facility]] : [facility]  [Integrative Therapy] : Integrative Therapy  [Supportive Therapy] : Supportive Therapy [de-identified] : Patient was seen for a 55 minute Telehealth therapy session. Patient reported he has be caring for his mother and mother-in-law both had surgeries and he is taking them to their doctor's appointment. Patient is gaining insight into his family dynamics and how this affected her self confidence.  He is enjoying  life and feels they work as a team and he feels connected.  Patient stated he has been eating healthy an has begun an exercised regiment.  He has been able to relax and enjoy himself without obsessing. Patient is learning self care and how to separate work and personal time. Patient will begin every other biweekly sessions. Utilized cognitive restructuring and mindfulness concepts to address worries about the future. Discussed the importance of self-care and identified activities, including attending medical appointments and engaging in socialization activities.\par \par \par

## 2023-07-07 ENCOUNTER — NON-APPOINTMENT (OUTPATIENT)
Age: 33
End: 2023-07-07

## 2023-07-17 ENCOUNTER — APPOINTMENT (OUTPATIENT)
Dept: PSYCHIATRY | Facility: CLINIC | Age: 33
End: 2023-07-17
Payer: COMMERCIAL

## 2023-07-17 PROCEDURE — 90834 PSYTX W PT 45 MINUTES: CPT | Mod: 95

## 2023-07-18 NOTE — REASON FOR VISIT
[Continuity of care] : to ensure continuity of care [Continuing, patient not seen in-person within last 12 months (provide details below)] : Telehealth services are continuing, patient not seen in-person within last 12 months.  [Telehealth (audio & video) - Individual/Group] : This visit was provided via telehealth using real-time 2-way audio visual technology. [Other Location: e.g. Home (Enter Location, City,State)___] : The provider was located at [unfilled]. [Home] : The patient, [unfilled], was located at home, [unfilled], at the time of the visit. [Patient] : Patient [FreeTextEntry4] : 1:00 [FreeTextEntry5] : 1:45 [FreeTextEntry3] :  working remotely [FreeTextEntry1] : Anxiety/Depression

## 2023-07-18 NOTE — BEHAVIORAL HEALTH
[No] : no [Cognitive and/or Behavior Therapy] : Cognitive and/or Behavior Therapy  [[place] [facility]] : [facility]  [Integrative Therapy] : Integrative Therapy  [Supportive Therapy] : Supportive Therapy [FreeTextEntry2] : Enhance ability to effectively cope with the full variety of life's anxieties.\par Resolve the core conflict that is the source of anxiety.\par Patient will continue to utilize strategies to manage obsessive thinking.\par Patient will refrain from impulsive/compulsive behaviors.\par Patient will utilize reviewed cognitive strategies to manage anxiety.\par \par  [de-identified] : Patient was seen for a 55 minute Telehealth therapy session. Patient reported he has be caring for his mother and mother-in-law both had surgeries and he is taking them to their doctor's appointment. Patient is gaining insight into his family dynamics and how this affected her self confidence.  He is enjoying  life and feels they work as a team and he feels connected.  Patient stated he has been eating healthy an has begun an exercised regiment.  He has been able to relax and enjoy himself without obsessing. Patient is learning self care and how to separate work and personal time. Patient will begin every other biweekly sessions. Utilized cognitive restructuring and mindfulness concepts to address worries about the future. Discussed the importance of self-care and identified activities, including attending medical appointments and engaging in socialization activities.\par \par \par

## 2023-07-18 NOTE — PLAN
[Cognitive and/or Behavior Therapy] : Cognitive and/or Behavior Therapy  [Integrative Therapy] : Integrative Therapy  [Psychoeducation] : Psychoeducation  [Supportive Therapy] : Supportive Therapy [FreeTextEntry2] : Enhance ability to effectively cope with the full variety of life's anxieties.\par Resolve the core conflict that is the source of anxiety.\par Patient will continue to utilize strategies to manage obsessive thinking.\par Patient will refrain from impulsive/compulsive behaviors.\par Patient will utilize reviewed cognitive strategies to manage anxiety.\par  [de-identified] : Patient was seen for a 45 minute Teledoc therapy session. Patient reported he is doing well, work is understaffed but has learned how to handle stressors at work. Patient reported he has gained weight and feels unhealthy and has started an exercise program and making healthier choices. In session explored different ways he can eat healthy and exercise. Patient stated he has learned how to set better boundaries with family and feels less stress.  Patient is exploring different opportunities as hobbies. Patient is learning self care and how to separate work and personal time. Utilized cognitive restructuring and mindfulness concepts to address worries about the future. Discussed the importance of self-care and identified activities, including attending medical appointments and engaging in socialization activities.\par \par \par  [FreeTextEntry1] : Patient will attend weekly session to learn new coping strategies to decrease anxiety and depression

## 2023-08-14 ENCOUNTER — APPOINTMENT (OUTPATIENT)
Dept: PSYCHIATRY | Facility: CLINIC | Age: 33
End: 2023-08-14
Payer: COMMERCIAL

## 2023-08-14 PROCEDURE — 90837 PSYTX W PT 60 MINUTES: CPT | Mod: 95

## 2023-08-15 NOTE — BEHAVIORAL HEALTH
[No] : no [FreeTextEntry2] : Enhance ability to effectively cope with the full variety of life's anxieties.\par  Resolve the core conflict that is the source of anxiety.\par  Patient will continue to utilize strategies to manage obsessive thinking.\par  Patient will refrain from impulsive/compulsive behaviors.\par  Patient will utilize reviewed cognitive strategies to manage anxiety.\par  \par   [Cognitive and/or Behavior Therapy] : Cognitive and/or Behavior Therapy  [[place] [facility]] : [facility]  [Integrative Therapy] : Integrative Therapy  [Supportive Therapy] : Supportive Therapy [de-identified] : Patient was seen for a 55 minute Telehealth therapy session. Patient reported he has be caring for his mother and mother-in-law both had surgeries and he is taking them to their doctor's appointment. Patient is gaining insight into his family dynamics and how this affected her self confidence.  He is enjoying  life and feels they work as a team and he feels connected.  Patient stated he has been eating healthy an has begun an exercised regiment.  He has been able to relax and enjoy himself without obsessing. Patient is learning self care and how to separate work and personal time. Patient will begin every other biweekly sessions. Utilized cognitive restructuring and mindfulness concepts to address worries about the future. Discussed the importance of self-care and identified activities, including attending medical appointments and engaging in socialization activities.\par  \par  \par

## 2023-08-15 NOTE — PLAN
[FreeTextEntry2] : Enhance ability to effectively cope with the full variety of life's anxieties.\par  Resolve the core conflict that is the source of anxiety.\par  Patient will continue to utilize strategies to manage obsessive thinking.\par  Patient will refrain from impulsive/compulsive behaviors.\par  Patient will utilize reviewed cognitive strategies to manage anxiety.\par   [Cognitive and/or Behavior Therapy] : Cognitive and/or Behavior Therapy  [Integrative Therapy] : Integrative Therapy  [Psychoeducation] : Psychoeducation  [Supportive Therapy] : Supportive Therapy [de-identified] : Patient was seen for a 55 minute Teledoc therapy session. Patient reported work is understaffed but has learned how to handle stressors at work. Patient reported he continues to struggle with his relationship with his brothers. In session working on how to set better boundaries with family and decrease  stress.  Patient is exploring different opportunities as hobbies. Patient is learning self-care and how to separate work and personal time. Utilized cognitive restructuring and mindfulness concepts to address worries about the future. Discussed the importance of self-care and identified activities, including attending medical appointments and engaging in socialization activities.    [FreeTextEntry1] : Patient will attend weekly session to learn new coping strategies to decrease anxiety and depression

## 2023-08-28 ENCOUNTER — APPOINTMENT (OUTPATIENT)
Dept: PSYCHIATRY | Facility: CLINIC | Age: 33
End: 2023-08-28
Payer: COMMERCIAL

## 2023-08-28 PROCEDURE — 90837 PSYTX W PT 60 MINUTES: CPT | Mod: 95

## 2023-09-01 NOTE — REASON FOR VISIT
[Continuity of care] : to ensure continuity of care [Continuing, patient not seen in-person within last 12 months (provide details below)] : Telehealth services are continuing, patient not seen in-person within last 12 months.  [Telehealth (audio & video) - Individual/Group] : This visit was provided via telehealth using real-time 2-way audio visual technology. [Other Location: e.g. Home (Enter Location, City,State)___] : The provider was located at [unfilled]. [Home] : The patient, [unfilled], was located at home, [unfilled], at the time of the visit. [FreeTextEntry4] : 1:00 [FreeTextEntry5] : 1:45 [FreeTextEntry3] :  working remotely [Patient] : Patient [FreeTextEntry1] : Anxiety/Depression

## 2023-09-01 NOTE — BEHAVIORAL HEALTH
[No] : no [FreeTextEntry2] : Enhance ability to effectively cope with the full variety of life's anxieties.\par  Resolve the core conflict that is the source of anxiety.\par  Patient will continue to utilize strategies to manage obsessive thinking.\par  Patient will refrain from impulsive/compulsive behaviors.\par  Patient will utilize reviewed cognitive strategies to manage anxiety.\par  \par   [Cognitive and/or Behavior Therapy] : Cognitive and/or Behavior Therapy  [[place] [facility]] : [place] [facility]  [Integrative Therapy] : Integrative Therapy  [Supportive Therapy] : Supportive Therapy [de-identified] : Patient was seen for a 55 minute Telehealth therapy session. Patient reported he has be caring for his mother and mother-in-law both had surgeries and he is taking them to their doctor's appointment. Patient is gaining insight into his family dynamics and how this affected her self confidence.  He is enjoying  life and feels they work as a team and he feels connected.  Patient stated he has been eating healthy an has begun an exercised regiment.  He has been able to relax and enjoy himself without obsessing. Patient is learning self care and how to separate work and personal time. Patient will begin every other biweekly sessions. Utilized cognitive restructuring and mindfulness concepts to address worries about the future. Discussed the importance of self-care and identified activities, including attending medical appointments and engaging in socialization activities.\par  \par  \par

## 2023-09-11 ENCOUNTER — APPOINTMENT (OUTPATIENT)
Dept: PSYCHIATRY | Facility: CLINIC | Age: 33
End: 2023-09-11

## 2023-09-25 ENCOUNTER — APPOINTMENT (OUTPATIENT)
Dept: PSYCHIATRY | Facility: CLINIC | Age: 33
End: 2023-09-25
Payer: COMMERCIAL

## 2023-09-25 PROCEDURE — 90837 PSYTX W PT 60 MINUTES: CPT | Mod: GT

## 2023-09-27 NOTE — REASON FOR VISIT
Subjective    Patient ID: Magno Zimmer is a 82 y.o. male who presents for Hospital Follow-up.     The patient is compliant with medications. Patient denies any side effects to the medications. The patient Is clinically stable so we will continue with current medications and lab work to confirm status ordered.       Hospital Discharge: The patient is presenting today for a follow up from a Hospital visit on  9/17-26/2023  for  septic shock secondary to pneumonia . He was admitted to [facility].    Discharge Facility: [facility]  Discharge Diagnosis: Septic Shock secondary to pneumonia  Admission Date: 9/17/2023  Discharge Date: 9/26/2023     PCP Appointment Date: 9/28/2023  Specialist Appointment Date: TBD  Hospital Encounter and Summary: Linked  See discharge assessment below for further details    Septic Shock secondary to pneumonia: pt is finishing doxycycline 100 mg tablet.  This was the admitting diagnosis for the hospital admission followed by a short rehab stay.  Patient was on IV medicines and supplemental oxygen and steroids.  He did improved to the point he was able to be discharged to rehab and then back to his apartment.  Hypertension: The patient is here for follow-up of elevated blood pressure. He is adherent to a low salt diet. Blood pressure is well controlled at home. No new myalgias or GI upset on diet control.    Diabetes Mellitus with CKD, stage 4: The patient presents today for a follow up of DM with CKD, stage 4. This patient has stage IV renal insufficiency which has been stable. Patient is encouraged to continue water intake. Patient is encouraged to remain very well hydrated.       Morbid Obesity: The patient is here for follow up of morbid obesity.  The patient is continuously working on diet and exercise. The patient will continue working on strategies to maintain weight loss and stay well hydrated.     Lymphedema of  bilateral lower extremities: pt is taking furosemide every other day to help alleviate symptoms.     Depression: Patient is presenting today for a follow up of depression. He voices that they are doing adequately.     Tremor: He is presenting today for a follow up of tremor. Tremor primarily involves the  bilateral hands .  Pt asking for a referral to neurology.     Phimosis: pt is asking for a referral to urology.  Hemorrhoids: pt is using coloplast paste for this current issue.     The patient denies having the following symptoms: chest pain, chest pressure, fever, chills, N/V/D, constipation, dizziness, headaches, SOB.    Objective   Vitals:  /66   Pulse 64   Temp 36.2 °C (97.2 °F)   Resp 14   SpO2 97%     Physical Exam  Vitals reviewed.   Constitutional:       Appearance: Normal appearance. He is obese.   Neck:      Vascular: No carotid bruit.   Cardiovascular:      Rate and Rhythm: Normal rate and regular rhythm.      Pulses: Normal pulses.      Heart sounds: Normal heart sounds.   Pulmonary:      Effort: Pulmonary effort is normal. No respiratory distress.      Breath sounds: Normal breath sounds. No wheezing.   Abdominal:      General: There is no distension.      Palpations: Abdomen is soft. There is no mass.      Tenderness: There is no abdominal tenderness. There is no right CVA tenderness, left CVA tenderness, guarding or rebound.   Musculoskeletal:      Cervical back: Normal range of motion and neck supple. No rigidity.      Right lower leg: No edema.      Left lower leg: No edema.   Lymphadenopathy:      Cervical: No cervical adenopathy.   Neurological:      Mental Status: He is alert.            Labs reviewed from : 9/17/2023 CMP, CBC, Lipid, HgA1C 7.8 % WNL.       Assessment/Plan   Problem List Items Addressed This Visit       Type 2 diabetes mellitus with stage 4 chronic kidney disease, without long-term current use of insulin (CMS/[facility])     Is clinically stable so we will continue with  current medications and lab work to confirm status ordered.           Relevant Medications    SITagliptin phosphate (Januvia) 25 mg tablet    SITagliptin phosphate (Januvia) 25 mg tablet    Other Relevant Orders    Hemoglobin A1C    Albumin , Urine Random    Follow Up In [facility] - PCP - Established    Morbid obesity (CMS/[facility])      This condition is poorly controlled, therapeutic changes necessary. The patient is continuously working on diet and exercise. The patient will continue working on strategies to maintain weight loss and stay well hydrated.            Lymphedema of both lower extremities      Is well controlled, continue with current medications.          Relevant Medications    furosemide (Lasix) 20 mg tablet    furosemide (Lasix) 20 mg tablet    Tremor      This condition is poorly controlled, therapeutic changes necessary.          Relevant Orders    Referral to Neurology    Pneumonia due to infectious organism      Is well controlled, continue with current medications.          Hypertensive heart disease with heart failure (CMS/[facility])      Is stable, continue with current treatment.           Relevant Orders    CBC and Auto Differential    Lipid Panel    Comprehensive Metabolic Panel    Sepsis (CMS/[facility]) - Primary      Is stable, continue with current treatment.          Recurrent major depressive disorder, in partial remission (CMS/[facility])      Is well controlled, continue with current medications.          Relevant Medications    escitalopram (Lexapro) 10 mg tablet    Phimosis      This condition is poorly controlled, therapeutic changes necessary.          Relevant Orders    Referral to Urology    Hemorrhoids      Is well controlled, continue with current medications.          Relevant Medications    ostomy supplies (Coloplast Paste) paste       Follow up in: 3 month(s) or sooner if needed with labs prior.     Scribe Attestation  By signing my name below, IJeannine , Jade sweetest  that this documentation has been prepared under the direction and in the presence of Jimi Orellana MD.    [Continuity of care] : to ensure continuity of care [Continuing, patient not seen in-person within last 12 months (provide details below)] : Telehealth services are continuing, patient not seen in-person within last 12 months.  [Telehealth (audio & video) - Individual/Group] : This visit was provided via telehealth using real-time 2-way audio visual technology. [Other Location: e.g. Home (Enter Location, City,State)___] : The provider was located at [unfilled]. [Home] : The patient, [unfilled], was located at home, [unfilled], at the time of the visit. [FreeTextEntry4] : 1:00 [FreeTextEntry5] : 1:55 [FreeTextEntry3] :  working remotely [Patient] : Patient [FreeTextEntry1] : Anxiety/Depression

## 2023-10-09 ENCOUNTER — APPOINTMENT (OUTPATIENT)
Dept: PSYCHIATRY | Facility: CLINIC | Age: 33
End: 2023-10-09
Payer: COMMERCIAL

## 2023-10-09 PROCEDURE — 90837 PSYTX W PT 60 MINUTES: CPT | Mod: GT

## 2023-10-23 ENCOUNTER — APPOINTMENT (OUTPATIENT)
Dept: PSYCHIATRY | Facility: CLINIC | Age: 33
End: 2023-10-23
Payer: COMMERCIAL

## 2023-10-23 PROCEDURE — 90837 PSYTX W PT 60 MINUTES: CPT | Mod: GT

## 2023-10-31 ENCOUNTER — APPOINTMENT (OUTPATIENT)
Dept: ENDOCRINOLOGY | Facility: CLINIC | Age: 33
End: 2023-10-31
Payer: COMMERCIAL

## 2023-10-31 VITALS
HEIGHT: 69 IN | DIASTOLIC BLOOD PRESSURE: 84 MMHG | BODY MASS INDEX: 39.1 KG/M2 | SYSTOLIC BLOOD PRESSURE: 122 MMHG | HEART RATE: 86 BPM | WEIGHT: 264 LBS | OXYGEN SATURATION: 98 %

## 2023-10-31 PROCEDURE — 95251 CONT GLUC MNTR ANALYSIS I&R: CPT

## 2023-10-31 PROCEDURE — 99214 OFFICE O/P EST MOD 30 MIN: CPT | Mod: 25

## 2023-10-31 RX ORDER — INSULIN LISPRO 100 [IU]/ML
100 INJECTION, SOLUTION INTRAVENOUS; SUBCUTANEOUS
Qty: 10 | Refills: 3 | Status: ACTIVE | COMMUNITY
Start: 2023-10-31 | End: 1900-01-01

## 2023-11-01 ENCOUNTER — APPOINTMENT (OUTPATIENT)
Dept: PSYCHIATRY | Facility: CLINIC | Age: 33
End: 2023-11-01
Payer: COMMERCIAL

## 2023-11-01 PROCEDURE — 99214 OFFICE O/P EST MOD 30 MIN: CPT | Mod: 95

## 2023-11-03 ENCOUNTER — TRANSCRIPTION ENCOUNTER (OUTPATIENT)
Age: 33
End: 2023-11-03

## 2023-11-03 LAB
ALBUMIN SERPL ELPH-MCNC: 4.4 G/DL
ALP BLD-CCNC: 70 U/L
ALT SERPL-CCNC: 29 U/L
ANION GAP SERPL CALC-SCNC: 8 MMOL/L
AST SERPL-CCNC: 25 U/L
BILIRUB SERPL-MCNC: 0.6 MG/DL
BUN SERPL-MCNC: 14 MG/DL
CALCIUM SERPL-MCNC: 9.4 MG/DL
CHLORIDE SERPL-SCNC: 101 MMOL/L
CHOLEST SERPL-MCNC: 155 MG/DL
CO2 SERPL-SCNC: 30 MMOL/L
CREAT SERPL-MCNC: 0.86 MG/DL
CREAT SPEC-SCNC: 209 MG/DL
EGFR: 116 ML/MIN/1.73M2
ESTIMATED AVERAGE GLUCOSE: 171 MG/DL
GLUCOSE SERPL-MCNC: 165 MG/DL
HBA1C MFR BLD HPLC: 7.6 %
HDLC SERPL-MCNC: 54 MG/DL
LDLC SERPL CALC-MCNC: 86 MG/DL
MICROALBUMIN 24H UR DL<=1MG/L-MCNC: <1.2 MG/DL
MICROALBUMIN/CREAT 24H UR-RTO: NORMAL MG/G
NONHDLC SERPL-MCNC: 101 MG/DL
POTASSIUM SERPL-SCNC: 5 MMOL/L
PROT SERPL-MCNC: 7 G/DL
SODIUM SERPL-SCNC: 139 MMOL/L
TRIGL SERPL-MCNC: 81 MG/DL
TSH SERPL-ACNC: 2.4 UIU/ML

## 2023-11-06 ENCOUNTER — APPOINTMENT (OUTPATIENT)
Dept: PSYCHIATRY | Facility: CLINIC | Age: 33
End: 2023-11-06
Payer: COMMERCIAL

## 2023-11-06 PROCEDURE — 90837 PSYTX W PT 60 MINUTES: CPT | Mod: GT

## 2023-11-20 ENCOUNTER — APPOINTMENT (OUTPATIENT)
Dept: PSYCHIATRY | Facility: CLINIC | Age: 33
End: 2023-11-20
Payer: COMMERCIAL

## 2023-11-20 ENCOUNTER — RX RENEWAL (OUTPATIENT)
Age: 33
End: 2023-11-20

## 2023-11-20 PROCEDURE — 90837 PSYTX W PT 60 MINUTES: CPT | Mod: GT

## 2023-11-20 RX ORDER — INSULIN LISPRO 100 [IU]/ML
100 INJECTION, SOLUTION INTRAVENOUS; SUBCUTANEOUS
Qty: 90 | Refills: 2 | Status: ACTIVE | COMMUNITY
Start: 2023-03-13 | End: 1900-01-01

## 2023-12-04 ENCOUNTER — APPOINTMENT (OUTPATIENT)
Dept: PSYCHIATRY | Facility: CLINIC | Age: 33
End: 2023-12-04
Payer: COMMERCIAL

## 2023-12-04 PROCEDURE — 90837 PSYTX W PT 60 MINUTES: CPT | Mod: GT

## 2023-12-18 ENCOUNTER — APPOINTMENT (OUTPATIENT)
Dept: PSYCHIATRY | Facility: CLINIC | Age: 33
End: 2023-12-18
Payer: COMMERCIAL

## 2023-12-18 PROCEDURE — 90837 PSYTX W PT 60 MINUTES: CPT | Mod: GT

## 2023-12-28 NOTE — PLAN
[FreeTextEntry2] : Enhance ability to effectively cope with the full variety of life's anxieties.\par  Resolve the core conflict that is the source of anxiety.\par  Patient will continue to utilize strategies to manage obsessive thinking.\par  Patient will refrain from impulsive/compulsive behaviors.\par  Patient will utilize reviewed cognitive strategies to manage anxiety.\par   [Cognitive and/or Behavior Therapy] : Cognitive and/or Behavior Therapy  [Integrative Therapy] : Integrative Therapy  [Psychoeducation] : Psychoeducation  [Supportive Therapy] : Supportive Therapy [de-identified] : Patient was seen for a 55 minute Teledoc therapy session. This writer is retiring from [employer] on Jan. 12th. Working with patient on transitioning.  Patient  reported he continues to struggle with eating healthy, he states he is a diabetic and needs to be more cognizant of his eating habits. In session worked on ways he can set himself up for success to begin an exercise program and healthier eating habits. Patient is going to start journaling and work share his writings in session. Patient feels he becomes anxious when he steps out of his comfort zone and is unable to explore other opportunities in his life.  Patient is learning self-care and how to separate work and personal time. Utilized cognitive restructuring and mindfulness concepts to address worries about the future. Discussed the importance of self-care and identified activities, including attending medical appointments and engaging in socialization activities.    [FreeTextEntry1] : Patient will attend weekly session to learn new coping strategies to decrease anxiety and depression Full Thickness Lip Wedge Repair (Flap) Text: Given the location of the defect and the proximity to free margins a full thickness wedge repair was deemed most appropriate.  Using a sterile surgical marker, the appropriate repair was drawn incorporating the defect and placing the expected incisions perpendicular to the vermilion border.  The vermilion border was also meticulously outlined to ensure appropriate reapproximation during the repair.  The area thus outlined was incised through and through with a #15 scalpel blade.  The muscularis and dermis were reaproximated with deep sutures following hemostasis. Care was taken to realign the vermilion border before proceeding with the superficial closure.  Once the vermilion was realigned the superfical and mucosal closure was finished.

## 2023-12-28 NOTE — BEHAVIORAL HEALTH
[No] : no [FreeTextEntry2] : Enhance ability to effectively cope with the full variety of life's anxieties.\par  Resolve the core conflict that is the source of anxiety.\par  Patient will continue to utilize strategies to manage obsessive thinking.\par  Patient will refrain from impulsive/compulsive behaviors.\par  Patient will utilize reviewed cognitive strategies to manage anxiety.\par  \par   [Cognitive and/or Behavior Therapy] : Cognitive and/or Behavior Therapy  [[place]] : [facility]  [Integrative Therapy] : Integrative Therapy  [Supportive Therapy] : Supportive Therapy [de-identified] : Patient was seen for a 55 minute Telehealth therapy session. Patient reported he has be caring for his mother and mother-in-law both had surgeries and he is taking them to their doctor's appointment. Patient is gaining insight into his family dynamics and how this affected her self confidence.  He is enjoying  life and feels they work as a team and he feels connected.  Patient stated he has been eating healthy an has begun an exercised regiment.  He has been able to relax and enjoy himself without obsessing. Patient is learning self care and how to separate work and personal time. Patient will begin every other biweekly sessions. Utilized cognitive restructuring and mindfulness concepts to address worries about the future. Discussed the importance of self-care and identified activities, including attending medical appointments and engaging in socialization activities.\par  \par  \par

## 2024-01-08 ENCOUNTER — APPOINTMENT (OUTPATIENT)
Dept: PSYCHIATRY | Facility: CLINIC | Age: 34
End: 2024-01-08

## 2024-01-11 RX ORDER — BLOOD-GLUCOSE SENSOR
EACH MISCELLANEOUS
Qty: 9 | Refills: 3 | Status: ACTIVE | COMMUNITY
Start: 2022-01-06 | End: 1900-01-01

## 2024-01-11 RX ORDER — INFUSION SET FOR INSULIN PUMP
INFUSION SETS-PARAPHERNALIA MISCELLANEOUS
Qty: 5 | Refills: 3 | Status: ACTIVE | COMMUNITY
Start: 2022-01-21 | End: 1900-01-01

## 2024-01-11 RX ORDER — INSULIN PUMP CARTRIDGE
CARTRIDGE (EA) SUBCUTANEOUS
Qty: 5 | Refills: 3 | Status: ACTIVE | COMMUNITY
Start: 2022-01-21 | End: 1900-01-01

## 2024-02-04 NOTE — REASON FOR VISIT
[Patient preference] : as per patient preference [Telehealth (audio & video) - Individual/Group] : This visit was provided via telehealth using real-time 2-way audio visual technology. [Medical Office: ([facility])___] : The provider was located at the medical office in [unfilled]. [Home] : The patient, [unfilled], was located at home, [unfilled], at the time of the visit. [Verbal consent obtained from patient/other participant(s)] : Verbal consent for telehealth/telephonic services obtained from patient/other participant(s) [Patient] : Patient

## 2024-02-06 ENCOUNTER — APPOINTMENT (OUTPATIENT)
Dept: PSYCHIATRY | Facility: CLINIC | Age: 34
End: 2024-02-06
Payer: COMMERCIAL

## 2024-02-06 PROCEDURE — 99214 OFFICE O/P EST MOD 30 MIN: CPT

## 2024-02-25 NOTE — PHYSICAL EXAM
[None] : none [Cooperative] : cooperative [Euthymic] : euthymic [Full] : full [Clear] : clear [Linear/Goal Directed] : linear/goal directed [None Reported] : none reported [WNL] : within normal limits [FreeTextEntry7] : No SI/HI

## 2024-02-25 NOTE — DISCUSSION/SUMMARY
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 11/29/2021: Patient states there is reduction in general anxiety and OCD sx since starting Prozac, but no incremental improvement since last visit, will increase dose for increased therapeutic benefit.  1/10/2022: Patient reports reduction in irritability and also not double checking things, still periodically has increase in anxiety feels overall with the current dose of Prozac he is able to cope with general anxiety well and using his coping skills effectively. 3/28/2022: Patient reports with therapy and medications he feels that his OCD and general anxiety symptoms are much better controlled on current Prozac dose and with weekly therapy   6/20/2022: Patient reports that he is doing well, reports sustained improvement of general anxiety and OCD symptoms, and feels both the therapy and medications are helping.  He was concerned that maybe the recent forgetfulness/misplacing things may be is a side effect from medication, reviewed medication side effects and the current symptoms that he is experiencing less likely related to medications or in general any anxiety symptoms, he will continue to monitor. 10/18/2022: The patient reported that he is continuing to do very well, her with sustained improvement of general anxiety and OCD symptoms. 2/07/2023: Patient is continuing to do well with good control of symptoms of general anxiety and OCD with current medications and continuation of individual psychotherapy. 11/1/2023: Patient continues to remain stable with OCD and general anxiety symptoms in remission. 2/6/2024: The patient remains stable, with OCD and DENI symptoms in remission

## 2024-02-25 NOTE — HISTORY OF PRESENT ILLNESS
[FreeTextEntry1] : This AV Visit was conducted using [employer] platform Patient states he is doing well since last visit and states he continues to feel well and stable overall with good control of general anxiety symptoms no longer feels tense, on edge and denied excessive worrying or getting irritable easily.  Patient reported that OCD symptoms also in good control.  Patient reported that sleep and appetite are good.   He states fluoxetine 30 mg daily and the individual therapy sessions are keeping him stable.   No new medical issues, no new medication since last visit He denied excess ETOH use/abuse.  He denied cannabis and other substance use/abuse

## 2024-02-25 NOTE — PLAN
[No] : No [Medication education provided] : Medication education provided. [Rationale for medication choices, possible risks/precautions, benefits, alternative treatment choices, and consequences of non-treatment discussed] : Rationale for medication choices, possible risks/precautions, benefits, alternative treatment choices, and consequences of non-treatment discussed with patient/family/caregiver  [FreeTextEntry5] : Psychoeducation and supportive therapy provided,  and discussed rationale for recommended meds, continue to monitor for stability  Medication: Continue fluoxetine 30 mg/day Educated patient of importance of remaining abstinent from drugs and alcohol.  Emergency procedures were discussed: pt. educated to call 911 or go to nearest ER for worsening of symptoms/suicidal/homicidal ideation.  Continue for individual psychotherapy to learn coping skills (Therapist left practice- and he is seeing her outside)  RTC in 3 months or earlier as needed  Patient given opportunity to ask questions and their questions were answered and they expressed understanding and agreement with above plan.

## 2024-03-08 PROBLEM — R10.32 ACUTE LEFT LOWER QUADRANT PAIN: Status: RESOLVED | Noted: 2021-09-07 | Resolved: 2024-03-08

## 2024-03-08 PROBLEM — S39.011A ABDOMINAL WALL STRAIN, INITIAL ENCOUNTER: Status: RESOLVED | Noted: 2021-09-07 | Resolved: 2024-03-08

## 2024-03-08 PROBLEM — U07.1 COVID-19: Status: RESOLVED | Noted: 2021-10-06 | Resolved: 2024-03-08

## 2024-03-08 PROBLEM — F41.9 ANXIETY: Noted: 2021-02-10

## 2024-03-08 PROBLEM — Z12.83 SKIN CANCER SCREENING: Status: RESOLVED | Noted: 2023-04-04 | Resolved: 2024-03-08

## 2024-03-08 NOTE — PHYSICAL EXAM
[No Acute Distress] : no acute distress [Well Nourished] : well nourished [Well-Appearing] : well-appearing [Well Developed] : well developed [Normal Sclera/Conjunctiva] : normal sclera/conjunctiva [PERRL] : pupils equal round and reactive to light [Normal Outer Ear/Nose] : the outer ears and nose were normal in appearance [EOMI] : extraocular movements intact [Normal Oropharynx] : the oropharynx was normal [No JVD] : no jugular venous distention [No Lymphadenopathy] : no lymphadenopathy [Supple] : supple [Thyroid Normal, No Nodules] : the thyroid was normal and there were no nodules present [No Accessory Muscle Use] : no accessory muscle use [No Respiratory Distress] : no respiratory distress  [Clear to Auscultation] : lungs were clear to auscultation bilaterally [Normal Rate] : normal rate  [Normal S1, S2] : normal S1 and S2 [Regular Rhythm] : with a regular rhythm [No Carotid Bruits] : no carotid bruits [No Murmur] : no murmur heard [Pedal Pulses Present] : the pedal pulses are present [No Varicosities] : no varicosities [No Abdominal Bruit] : a ~M bruit was not heard ~T in the abdomen [No Palpable Aorta] : no palpable aorta [No Edema] : there was no peripheral edema [No Extremity Clubbing/Cyanosis] : no extremity clubbing/cyanosis [Soft] : abdomen soft [Non-distended] : non-distended [Non Tender] : non-tender [No Masses] : no abdominal mass palpated [No HSM] : no HSM [Normal Bowel Sounds] : normal bowel sounds [Normal Posterior Cervical Nodes] : no posterior cervical lymphadenopathy [Normal Anterior Cervical Nodes] : no anterior cervical lymphadenopathy [No CVA Tenderness] : no CVA  tenderness [No Spinal Tenderness] : no spinal tenderness [No Joint Swelling] : no joint swelling [Grossly Normal Strength/Tone] : grossly normal strength/tone [Coordination Grossly Intact] : coordination grossly intact [No Rash] : no rash [Normal Gait] : normal gait [No Focal Deficits] : no focal deficits [Normal Affect] : the affect was normal [Deep Tendon Reflexes (DTR)] : deep tendon reflexes were 2+ and symmetric [Normal Insight/Judgement] : insight and judgment were intact

## 2024-03-15 RX ORDER — INSULIN ASPART 100 [IU]/ML
100 INJECTION, SOLUTION INTRAVENOUS; SUBCUTANEOUS
Qty: 90 | Refills: 1 | Status: ACTIVE | COMMUNITY
Start: 2024-03-15

## 2024-03-21 ENCOUNTER — APPOINTMENT (OUTPATIENT)
Dept: INTERNAL MEDICINE | Facility: CLINIC | Age: 34
End: 2024-03-21
Payer: COMMERCIAL

## 2024-03-21 ENCOUNTER — OUTPATIENT (OUTPATIENT)
Dept: OUTPATIENT SERVICES | Facility: HOSPITAL | Age: 34
LOS: 1 days | End: 2024-03-21
Payer: COMMERCIAL

## 2024-03-21 DIAGNOSIS — Z00.00 ENCOUNTER FOR GENERAL ADULT MEDICAL EXAMINATION W/OUT ABNORMAL FINDINGS: ICD-10-CM

## 2024-03-21 DIAGNOSIS — R10.32 LEFT LOWER QUADRANT PAIN: ICD-10-CM

## 2024-03-21 DIAGNOSIS — I10 ESSENTIAL (PRIMARY) HYPERTENSION: ICD-10-CM

## 2024-03-21 DIAGNOSIS — Z12.83 ENCOUNTER FOR SCREENING FOR MALIGNANT NEOPLASM OF SKIN: ICD-10-CM

## 2024-03-21 DIAGNOSIS — M79.643 PAIN IN UNSPECIFIED HAND: ICD-10-CM

## 2024-03-21 DIAGNOSIS — S39.011A STRAIN OF MUSCLE, FASCIA AND TENDON OF ABDOMEN, INITIAL ENCOUNTER: ICD-10-CM

## 2024-03-21 DIAGNOSIS — U07.1 COVID-19: ICD-10-CM

## 2024-03-21 DIAGNOSIS — F41.9 ANXIETY DISORDER, UNSPECIFIED: ICD-10-CM

## 2024-03-21 DIAGNOSIS — M25.539 PAIN IN UNSPECIFIED WRIST: ICD-10-CM

## 2024-03-21 PROCEDURE — 99395 PREV VISIT EST AGE 18-39: CPT

## 2024-03-21 PROCEDURE — G0463: CPT

## 2024-03-22 VITALS
HEART RATE: 78 BPM | SYSTOLIC BLOOD PRESSURE: 124 MMHG | DIASTOLIC BLOOD PRESSURE: 78 MMHG | RESPIRATION RATE: 14 BRPM | WEIGHT: 262 LBS | BODY MASS INDEX: 38.69 KG/M2

## 2024-03-22 RX ORDER — BLOOD-GLUCOSE TRANSMITTER
EACH MISCELLANEOUS
Qty: 1 | Refills: 3 | Status: COMPLETED | COMMUNITY
Start: 2022-01-06 | End: 2024-03-22

## 2024-03-22 RX ORDER — INSULIN GLARGINE 100 [IU]/ML
100 INJECTION, SOLUTION SUBCUTANEOUS AT BEDTIME
Qty: 5 | Refills: 3 | Status: COMPLETED | COMMUNITY
Start: 2022-04-25 | End: 2024-03-22

## 2024-03-22 RX ORDER — FLUTICASONE PROPIONATE AND SALMETEROL 250; 50 UG/1; UG/1
250-50 POWDER RESPIRATORY (INHALATION) TWICE DAILY
Qty: 1 | Refills: 1 | Status: COMPLETED | COMMUNITY
Start: 2021-10-12 | End: 2024-03-22

## 2024-03-22 RX ORDER — GLUCAGON 1 MG
1 KIT INJECTION
Qty: 1 | Refills: 2 | Status: COMPLETED | COMMUNITY
Start: 2020-03-09 | End: 2024-03-22

## 2024-03-22 RX ORDER — INSULIN LISPRO 100 [IU]/ML
100 INJECTION, SOLUTION INTRAVENOUS; SUBCUTANEOUS
Qty: 9 | Refills: 3 | Status: COMPLETED | COMMUNITY
Start: 2020-09-16 | End: 2024-03-22

## 2024-03-22 RX ORDER — ALBUTEROL SULFATE 90 UG/1
108 (90 BASE) AEROSOL, METERED RESPIRATORY (INHALATION)
Qty: 1 | Refills: 2 | Status: COMPLETED | COMMUNITY
Start: 2021-10-06 | End: 2024-03-22

## 2024-03-22 NOTE — HISTORY OF PRESENT ILLNESS
[FreeTextEntry1] : Mr. BRITO is here for an annual physical examination and assessment. [de-identified] : He generally feels well with no specific complaints. His recent health has been good. Wishes to get a calcium score to further risk stratify CV risk given long standing history of DM  Denies headache, dizziness. Denies rash, fatigue, fever, weight loss, anorexia. Denies cough, wheezing. Denies CP, SOB, IBRAHIM, edema, palpitations. Denies abdominal pain, N/V/D/C. Denies BRBPR, melena, dysphagia. Denies dysuria, frequency, hematuria, hesitancy. Denies weakness, numbness, gait instability.

## 2024-03-22 NOTE — HEALTH RISK ASSESSMENT
[Excellent] : ~his/her~ current health as excellent [No] : In the past 12 months have you used drugs other than those required for medical reasons? No [No falls in past year] : Patient reported no falls in the past year [0] : 2) Feeling down, depressed, or hopeless: Not at all (0) [PHQ-2 Negative - No further assessment needed] : PHQ-2 Negative - No further assessment needed [Feels Safe at Home] : Feels safe at home [None] : None [Fully functional (bathing, dressing, toileting, transferring, walking, feeding)] : Fully functional (bathing, dressing, toileting, transferring, walking, feeding) [Fully functional (using the telephone, shopping, preparing meals, housekeeping, doing laundry, using] : Fully functional and needs no help or supervision to perform IADLs (using the telephone, shopping, preparing meals, housekeeping, doing laundry, using transportation, managing medications and managing finances) [Reports normal functional visual acuity (ie: able to read med bottle)] : Reports normal functional visual acuity [Seat Belt] :  uses seat belt [Smoke Detector] : smoke detector [Never] : Never [Yes] : Yes [Monthly or less (1 pt)] : Monthly or less (1 point) [1 or 2 (0 pts)] : 1 or 2 (0 points) [Never (0 pts)] : Never (0 points) [Audit-CScore] : 1 [[id number]] : 0 [Change in mental status noted] : No change in mental status noted [Behavior] : denies difficulty with behavior [Language] : denies difficulty with language [Handling Complex Tasks] : denies difficulty handling complex tasks [Learning/Retaining New Information] : denies difficulty learning/retaining new information [Reasoning] : denies difficulty with reasoning [With Family] : lives with family [Spatial Ability and Orientation] : denies difficulty with spatial ability and orientation [Employed] : employed [] :  [Reports changes in vision] : Reports no changes in vision [Reports changes in hearing] : Reports no changes in hearing [Reports changes in dental health] : Reports no changes in dental health

## 2024-03-25 ENCOUNTER — OUTPATIENT (OUTPATIENT)
Dept: OUTPATIENT SERVICES | Facility: HOSPITAL | Age: 34
LOS: 1 days | End: 2024-03-25
Payer: COMMERCIAL

## 2024-03-25 DIAGNOSIS — Z00.00 ENCOUNTER FOR GENERAL ADULT MEDICAL EXAMINATION WITHOUT ABNORMAL FINDINGS: ICD-10-CM

## 2024-03-25 PROCEDURE — 75571 CT HRT W/O DYE W/CA TEST: CPT

## 2024-03-25 PROCEDURE — 75571 CT HRT W/O DYE W/CA TEST: CPT | Mod: 26

## 2024-03-26 DIAGNOSIS — Z00.00 ENCOUNTER FOR GENERAL ADULT MEDICAL EXAMINATION WITHOUT ABNORMAL FINDINGS: ICD-10-CM

## 2024-05-01 ENCOUNTER — RX RENEWAL (OUTPATIENT)
Age: 34
End: 2024-05-01

## 2024-05-01 RX ORDER — ATORVASTATIN CALCIUM 20 MG/1
20 TABLET, FILM COATED ORAL
Qty: 90 | Refills: 3 | Status: ACTIVE | COMMUNITY
Start: 2022-11-09 | End: 1900-01-01

## 2024-05-29 ENCOUNTER — APPOINTMENT (OUTPATIENT)
Dept: PSYCHIATRY | Facility: CLINIC | Age: 34
End: 2024-05-29
Payer: COMMERCIAL

## 2024-05-29 DIAGNOSIS — F41.1 GENERALIZED ANXIETY DISORDER: ICD-10-CM

## 2024-05-29 DIAGNOSIS — F42.9 OBSESSIVE-COMPULSIVE DISORDER, UNSPECIFIED: ICD-10-CM

## 2024-05-29 PROCEDURE — 99214 OFFICE O/P EST MOD 30 MIN: CPT | Mod: 95

## 2024-05-29 PROCEDURE — G2211 COMPLEX E/M VISIT ADD ON: CPT | Mod: 95

## 2024-05-29 RX ORDER — FLUOXETINE HYDROCHLORIDE 20 MG/1
20 TABLET ORAL DAILY
Qty: 135 | Refills: 0 | Status: ACTIVE | COMMUNITY
Start: 2021-10-05 | End: 1900-01-01

## 2024-06-02 PROBLEM — F42.9 OCD (OBSESSIVE COMPULSIVE DISORDER): Status: ACTIVE | Noted: 2021-10-05

## 2024-06-02 PROBLEM — F41.1 GAD (GENERALIZED ANXIETY DISORDER): Status: ACTIVE | Noted: 2021-10-05

## 2024-06-02 NOTE — HISTORY OF PRESENT ILLNESS
[FreeTextEntry1] : This AV Visit was conducted using [employer] platform Patient states he is overall continuing to do well since last visit. States his GM, age 95 passed away recently, took time off for bereavement last week. He reported good mood and reported good control of general anxiety symptoms no longer feels tense, on edge and denied excessive worrying or getting irritable easily.  Patient reported that OCD symptoms also in good control.  Patient reported that sleep and appetite are good.   He states fluoxetine 30 mg daily and the individual therapy sessions are keeping him stable.   No new medical issues, no new medication since last visit He denied excess ETOH use/abuse.  He denied cannabis and other substance use/abuse

## 2024-06-02 NOTE — DISCUSSION/SUMMARY
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 11/29/2021: Patient states there is reduction in general anxiety and OCD sx since starting Prozac, but no incremental improvement since last visit, will increase dose for increased therapeutic benefit.  1/10/2022: Patient reports reduction in irritability and also not double checking things, still periodically has increase in anxiety feels overall with the current dose of Prozac he is able to cope with general anxiety well and using his coping skills effectively. 3/28/2022: Patient reports with therapy and medications he feels that his OCD and general anxiety symptoms are much better controlled on current Prozac dose and with weekly therapy   6/20/2022: Patient reports that he is doing well, reports sustained improvement of general anxiety and OCD symptoms, and feels both the therapy and medications are helping.  He was concerned that maybe the recent forgetfulness/misplacing things may be is a side effect from medication, reviewed medication side effects and the current symptoms that he is experiencing less likely related to medications or in general any anxiety symptoms, he will continue to monitor. 10/18/2022: The patient reported that he is continuing to do very well, her with sustained improvement of general anxiety and OCD symptoms. 2/07/2023: Patient is continuing to do well with good control of symptoms of general anxiety and OCD with current medications and continuation of individual psychotherapy. 11/1/2023: Patient continues to remain stable with OCD and general anxiety symptoms in remission. 2/6/2024: The patient remains stable, with OCD and DENI symptoms in remission  5/29/2024: The patient continues to remain stable, with both OCD and DENI symptoms in remission on current meds

## 2024-07-01 ENCOUNTER — APPOINTMENT (OUTPATIENT)
Dept: ENDOCRINOLOGY | Facility: CLINIC | Age: 34
End: 2024-07-01
Payer: COMMERCIAL

## 2024-07-01 VITALS
HEART RATE: 78 BPM | BODY MASS INDEX: 39.99 KG/M2 | WEIGHT: 270 LBS | OXYGEN SATURATION: 98 % | DIASTOLIC BLOOD PRESSURE: 70 MMHG | HEIGHT: 69 IN | SYSTOLIC BLOOD PRESSURE: 126 MMHG

## 2024-07-01 DIAGNOSIS — Z96.41 PRESENCE OF INSULIN PUMP (EXTERNAL) (INTERNAL): ICD-10-CM

## 2024-07-01 DIAGNOSIS — E55.9 VITAMIN D DEFICIENCY, UNSPECIFIED: ICD-10-CM

## 2024-07-01 DIAGNOSIS — E78.5 HYPERLIPIDEMIA, UNSPECIFIED: ICD-10-CM

## 2024-07-01 DIAGNOSIS — E10.9 TYPE 1 DIABETES MELLITUS W/OUT COMPLICATIONS: ICD-10-CM

## 2024-07-01 LAB — HBA1C MFR BLD HPLC: 6.8

## 2024-07-01 PROCEDURE — 99214 OFFICE O/P EST MOD 30 MIN: CPT

## 2024-07-01 PROCEDURE — 83036 HEMOGLOBIN GLYCOSYLATED A1C: CPT | Mod: QW

## 2024-07-01 PROCEDURE — 95251 CONT GLUC MNTR ANALYSIS I&R: CPT

## 2024-07-02 LAB
25(OH)D3 SERPL-MCNC: 32.6 NG/ML
ALBUMIN SERPL ELPH-MCNC: 4.7 G/DL
ALP BLD-CCNC: 72 U/L
ALT SERPL-CCNC: 32 U/L
ANION GAP SERPL CALC-SCNC: 11 MMOL/L
AST SERPL-CCNC: 27 U/L
BASOPHILS # BLD AUTO: 0.01 K/UL
BASOPHILS NFR BLD AUTO: 0.2 %
BILIRUB SERPL-MCNC: 0.7 MG/DL
BUN SERPL-MCNC: 15 MG/DL
CALCIUM SERPL-MCNC: 9.7 MG/DL
CHLORIDE SERPL-SCNC: 101 MMOL/L
CHOLEST SERPL-MCNC: 153 MG/DL
CO2 SERPL-SCNC: 30 MMOL/L
CREAT SERPL-MCNC: 0.9 MG/DL
EGFR: 114 ML/MIN/1.73M2
EOSINOPHIL # BLD AUTO: 0.1 K/UL
EOSINOPHIL NFR BLD AUTO: 1.8 %
ESTIMATED AVERAGE GLUCOSE: 151 MG/DL
GLUCOSE SERPL-MCNC: 164 MG/DL
HBA1C MFR BLD HPLC: 6.9 %
HCT VFR BLD CALC: 43.7 %
HDLC SERPL-MCNC: 59 MG/DL
HGB BLD-MCNC: 14.8 G/DL
IMM GRANULOCYTES NFR BLD AUTO: 0.2 %
LDLC SERPL CALC-MCNC: 80 MG/DL
LYMPHOCYTES # BLD AUTO: 1.19 K/UL
LYMPHOCYTES NFR BLD AUTO: 21.5 %
MAN DIFF?: NORMAL
MCHC RBC-ENTMCNC: 28.4 PG
MCHC RBC-ENTMCNC: 33.9 GM/DL
MCV RBC AUTO: 83.9 FL
MONOCYTES # BLD AUTO: 0.52 K/UL
MONOCYTES NFR BLD AUTO: 9.4 %
NEUTROPHILS # BLD AUTO: 3.7 K/UL
NEUTROPHILS NFR BLD AUTO: 66.9 %
NONHDLC SERPL-MCNC: 94 MG/DL
PLATELET # BLD AUTO: 256 K/UL
POTASSIUM SERPL-SCNC: 5.3 MMOL/L
PROT SERPL-MCNC: 7 G/DL
PSA SERPL-MCNC: 0.33 NG/ML
RBC # BLD: 5.21 M/UL
RBC # FLD: 12.9 %
SODIUM SERPL-SCNC: 142 MMOL/L
TRIGL SERPL-MCNC: 71 MG/DL
WBC # FLD AUTO: 5.53 K/UL

## 2024-07-05 ENCOUNTER — TRANSCRIPTION ENCOUNTER (OUTPATIENT)
Age: 34
End: 2024-07-05

## 2024-07-05 LAB
CREAT SPEC-SCNC: 134 MG/DL
MICROALBUMIN 24H UR DL<=1MG/L-MCNC: <1.2 MG/DL
MICROALBUMIN/CREAT 24H UR-RTO: NORMAL MG/G
T4 FREE SERPL-MCNC: 1.3 NG/DL
TSH SERPL-ACNC: 1.89 UIU/ML

## 2024-08-02 ENCOUNTER — RX RENEWAL (OUTPATIENT)
Age: 34
End: 2024-08-02

## 2024-08-28 ENCOUNTER — APPOINTMENT (OUTPATIENT)
Dept: PSYCHIATRY | Facility: CLINIC | Age: 34
End: 2024-08-28
Payer: COMMERCIAL

## 2024-08-28 DIAGNOSIS — F41.1 GENERALIZED ANXIETY DISORDER: ICD-10-CM

## 2024-08-28 DIAGNOSIS — F42.9 OBSESSIVE-COMPULSIVE DISORDER, UNSPECIFIED: ICD-10-CM

## 2024-08-28 PROCEDURE — 99214 OFFICE O/P EST MOD 30 MIN: CPT | Mod: 95

## 2024-08-28 PROCEDURE — G2211 COMPLEX E/M VISIT ADD ON: CPT | Mod: 95

## 2024-09-02 NOTE — DISCUSSION/SUMMARY
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 11/29/2021: Patient states there is reduction in general anxiety and OCD sx since starting Prozac, but no incremental improvement since last visit, will increase dose for increased therapeutic benefit.  1/10/2022: Patient reports reduction in irritability and also not double checking things, still periodically has increase in anxiety feels overall with the current dose of Prozac he is able to cope with general anxiety well and using his coping skills effectively. 3/28/2022: Patient reports with therapy and medications he feels that his OCD and general anxiety symptoms are much better controlled on current Prozac dose and with weekly therapy   6/20/2022: Patient reports that he is doing well, reports sustained improvement of general anxiety and OCD symptoms, and feels both the therapy and medications are helping.  He was concerned that maybe the recent forgetfulness/misplacing things may be is a side effect from medication, reviewed medication side effects and the current symptoms that he is experiencing less likely related to medications or in general any anxiety symptoms, he will continue to monitor. 10/18/2022: The patient reported that he is continuing to do very well, her with sustained improvement of general anxiety and OCD symptoms. 2/07/2023: Patient is continuing to do well with good control of symptoms of general anxiety and OCD with current medications and continuation of individual psychotherapy. 11/1/2023: Patient continues to remain stable with OCD and general anxiety symptoms in remission. 2/6/2024: The patient remains stable, with OCD and DENI symptoms in remission  5/29/2024: The patient continues to remain stable, with both OCD and DENI symptoms in remission on current meds 08/28/2024: The patient remains stable with good control of  both OCD and DENI symptoms in remission on current meds

## 2024-09-02 NOTE — HISTORY OF PRESENT ILLNESS
[FreeTextEntry1] : This AV Visit was conducted using [employer] platform Patient reported he is continuing to do well since last visit. 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  He reported good mood and reported good control of general anxiety symptoms no longer feels tense, on edge and denied excessive worrying or getting irritable easily.  Patient reported that OCD symptoms also in good control.  Patient reported that sleep and appetite are good.   He states fluoxetine 30 mg daily and the individual therapy sessions are keeping him stable.   No new medical issues, no new medication since last visit He denied excess ETOH use/abuse.  He denied cannabis and other substance use/abuse

## 2024-09-02 NOTE — HISTORY OF PRESENT ILLNESS
[FreeTextEntry1] : This AV Visit was conducted using [employer] platform Patient reported he is continuing to do well since last visit. 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  He reported good mood and reported good control of general anxiety symptoms no longer feels tense, on edge and denied excessive worrying or getting irritable easily.  Patient reported that OCD symptoms also in good control.  Patient reported that sleep and appetite are good.   He states fluoxetine 30 mg daily and the individual therapy sessions are keeping him stable.   No new medical issues, no new medication since last visit He denied excess ETOH use/abuse.  He denied cannabis and other substance use/abuse

## 2024-09-04 ENCOUNTER — TRANSCRIPTION ENCOUNTER (OUTPATIENT)
Age: 34
End: 2024-09-04

## 2025-02-14 NOTE — ED ADULT NURSE NOTE - [id number]
It was nice to see you again. Please contact our office with any questions/concerns, side effects, or worsening of symptoms: [phone]. Thank you!     No